# Patient Record
Sex: MALE | Race: OTHER | NOT HISPANIC OR LATINO | URBAN - METROPOLITAN AREA
[De-identification: names, ages, dates, MRNs, and addresses within clinical notes are randomized per-mention and may not be internally consistent; named-entity substitution may affect disease eponyms.]

---

## 2020-07-28 ENCOUNTER — INPATIENT (INPATIENT)
Facility: HOSPITAL | Age: 80
LOS: 2 days | Discharge: HOME | End: 2020-07-31
Attending: HOSPITALIST | Admitting: HOSPITALIST
Payer: MEDICARE

## 2020-07-28 VITALS
TEMPERATURE: 98 F | SYSTOLIC BLOOD PRESSURE: 192 MMHG | DIASTOLIC BLOOD PRESSURE: 80 MMHG | HEART RATE: 95 BPM | RESPIRATION RATE: 16 BRPM | OXYGEN SATURATION: 98 %

## 2020-07-28 DIAGNOSIS — R06.02 SHORTNESS OF BREATH: ICD-10-CM

## 2020-07-28 DIAGNOSIS — E11.9 TYPE 2 DIABETES MELLITUS WITHOUT COMPLICATIONS: ICD-10-CM

## 2020-07-28 DIAGNOSIS — H40.9 UNSPECIFIED GLAUCOMA: ICD-10-CM

## 2020-07-28 DIAGNOSIS — I82.90 ACUTE EMBOLISM AND THROMBOSIS OF UNSPECIFIED VEIN: ICD-10-CM

## 2020-07-28 DIAGNOSIS — I10 ESSENTIAL (PRIMARY) HYPERTENSION: ICD-10-CM

## 2020-07-28 LAB
ALBUMIN SERPL ELPH-MCNC: 4.4 G/DL — SIGNIFICANT CHANGE UP (ref 3.3–5)
ALP SERPL-CCNC: 68 U/L — SIGNIFICANT CHANGE UP (ref 40–120)
ALT FLD-CCNC: 19 U/L — SIGNIFICANT CHANGE UP (ref 4–41)
ANION GAP SERPL CALC-SCNC: 15 MMO/L — HIGH (ref 7–14)
APTT BLD: 32.5 SEC — SIGNIFICANT CHANGE UP (ref 27–36.3)
AST SERPL-CCNC: 19 U/L — SIGNIFICANT CHANGE UP (ref 4–40)
BASE EXCESS BLDV CALC-SCNC: 6.1 MMOL/L — SIGNIFICANT CHANGE UP
BASOPHILS # BLD AUTO: 0.03 K/UL — SIGNIFICANT CHANGE UP (ref 0–0.2)
BASOPHILS NFR BLD AUTO: 0.3 % — SIGNIFICANT CHANGE UP (ref 0–2)
BILIRUB SERPL-MCNC: 0.6 MG/DL — SIGNIFICANT CHANGE UP (ref 0.2–1.2)
BLOOD GAS VENOUS - CREATININE: 1.15 MG/DL — SIGNIFICANT CHANGE UP (ref 0.5–1.3)
BLOOD GAS VENOUS - FIO2: 21 — SIGNIFICANT CHANGE UP
BUN SERPL-MCNC: 26 MG/DL — HIGH (ref 7–23)
CALCIUM SERPL-MCNC: 9.5 MG/DL — SIGNIFICANT CHANGE UP (ref 8.4–10.5)
CHLORIDE BLDV-SCNC: 101 MMOL/L — SIGNIFICANT CHANGE UP (ref 96–108)
CHLORIDE SERPL-SCNC: 94 MMOL/L — LOW (ref 98–107)
CO2 SERPL-SCNC: 28 MMOL/L — SIGNIFICANT CHANGE UP (ref 22–31)
CREAT SERPL-MCNC: 1.17 MG/DL — SIGNIFICANT CHANGE UP (ref 0.5–1.3)
D DIMER BLD IA.RAPID-MCNC: 251 NG/ML — SIGNIFICANT CHANGE UP
EOSINOPHIL # BLD AUTO: 0.71 K/UL — HIGH (ref 0–0.5)
EOSINOPHIL NFR BLD AUTO: 7.5 % — HIGH (ref 0–6)
GAS PNL BLDV: 132 MMOL/L — LOW (ref 136–146)
GLUCOSE BLDC GLUCOMTR-MCNC: 249 MG/DL — HIGH (ref 70–99)
GLUCOSE BLDC GLUCOMTR-MCNC: 291 MG/DL — HIGH (ref 70–99)
GLUCOSE BLDV-MCNC: 166 MG/DL — HIGH (ref 70–99)
GLUCOSE SERPL-MCNC: 169 MG/DL — HIGH (ref 70–99)
HCO3 BLDV-SCNC: 26 MMOL/L — SIGNIFICANT CHANGE UP (ref 20–27)
HCT VFR BLD CALC: 48 % — SIGNIFICANT CHANGE UP (ref 39–50)
HCT VFR BLDV CALC: 52.7 % — HIGH (ref 39–51)
HGB BLD-MCNC: 15.5 G/DL — SIGNIFICANT CHANGE UP (ref 13–17)
HGB BLDV-MCNC: 17.2 G/DL — HIGH (ref 13–17)
IMM GRANULOCYTES NFR BLD AUTO: 0.3 % — SIGNIFICANT CHANGE UP (ref 0–1.5)
INR BLD: 0.99 — SIGNIFICANT CHANGE UP (ref 0.88–1.17)
LACTATE BLDV-MCNC: 2 MMOL/L — SIGNIFICANT CHANGE UP (ref 0.5–2)
LYMPHOCYTES # BLD AUTO: 2.17 K/UL — SIGNIFICANT CHANGE UP (ref 1–3.3)
LYMPHOCYTES # BLD AUTO: 22.8 % — SIGNIFICANT CHANGE UP (ref 13–44)
MCHC RBC-ENTMCNC: 28.9 PG — SIGNIFICANT CHANGE UP (ref 27–34)
MCHC RBC-ENTMCNC: 32.3 % — SIGNIFICANT CHANGE UP (ref 32–36)
MCV RBC AUTO: 89.4 FL — SIGNIFICANT CHANGE UP (ref 80–100)
MONOCYTES # BLD AUTO: 0.94 K/UL — HIGH (ref 0–0.9)
MONOCYTES NFR BLD AUTO: 9.9 % — SIGNIFICANT CHANGE UP (ref 2–14)
NEUTROPHILS # BLD AUTO: 5.62 K/UL — SIGNIFICANT CHANGE UP (ref 1.8–7.4)
NEUTROPHILS NFR BLD AUTO: 59.2 % — SIGNIFICANT CHANGE UP (ref 43–77)
NRBC # FLD: 0 K/UL — SIGNIFICANT CHANGE UP (ref 0–0)
NT-PROBNP SERPL-SCNC: 63.35 PG/ML — SIGNIFICANT CHANGE UP
NT-PROBNP SERPL-SCNC: 78.32 PG/ML — SIGNIFICANT CHANGE UP
PCO2 BLDV: 70 MMHG — HIGH (ref 41–51)
PH BLDV: 7.29 PH — LOW (ref 7.32–7.43)
PLATELET # BLD AUTO: 194 K/UL — SIGNIFICANT CHANGE UP (ref 150–400)
PMV BLD: 11.4 FL — SIGNIFICANT CHANGE UP (ref 7–13)
PO2 BLDV: 32 MMHG — LOW (ref 35–40)
POTASSIUM BLDV-SCNC: 4 MMOL/L — SIGNIFICANT CHANGE UP (ref 3.4–4.5)
POTASSIUM SERPL-MCNC: 4.5 MMOL/L — SIGNIFICANT CHANGE UP (ref 3.5–5.3)
POTASSIUM SERPL-SCNC: 4.5 MMOL/L — SIGNIFICANT CHANGE UP (ref 3.5–5.3)
PROT SERPL-MCNC: 7.7 G/DL — SIGNIFICANT CHANGE UP (ref 6–8.3)
PROTHROM AB SERPL-ACNC: 11.3 SEC — SIGNIFICANT CHANGE UP (ref 9.8–13.1)
RBC # BLD: 5.37 M/UL — SIGNIFICANT CHANGE UP (ref 4.2–5.8)
RBC # FLD: 13 % — SIGNIFICANT CHANGE UP (ref 10.3–14.5)
SAO2 % BLDV: 49.2 % — LOW (ref 60–85)
SARS-COV-2 RNA SPEC QL NAA+PROBE: SIGNIFICANT CHANGE UP
SODIUM SERPL-SCNC: 137 MMOL/L — SIGNIFICANT CHANGE UP (ref 135–145)
TROPONIN T, HIGH SENSITIVITY: 22 NG/L — SIGNIFICANT CHANGE UP (ref ?–14)
TROPONIN T, HIGH SENSITIVITY: 28 NG/L — SIGNIFICANT CHANGE UP (ref ?–14)
WBC # BLD: 9.5 K/UL — SIGNIFICANT CHANGE UP (ref 3.8–10.5)
WBC # FLD AUTO: 9.5 K/UL — SIGNIFICANT CHANGE UP (ref 3.8–10.5)

## 2020-07-28 PROCEDURE — 71250 CT THORAX DX C-: CPT | Mod: 26

## 2020-07-28 PROCEDURE — 71045 X-RAY EXAM CHEST 1 VIEW: CPT | Mod: 26

## 2020-07-28 PROCEDURE — 99285 EMERGENCY DEPT VISIT HI MDM: CPT

## 2020-07-28 PROCEDURE — 99223 1ST HOSP IP/OBS HIGH 75: CPT

## 2020-07-28 RX ORDER — HYDRALAZINE HCL 50 MG
5 TABLET ORAL ONCE
Refills: 0 | Status: COMPLETED | OUTPATIENT
Start: 2020-07-28 | End: 2020-07-28

## 2020-07-28 RX ORDER — LOSARTAN POTASSIUM 100 MG/1
100 TABLET, FILM COATED ORAL DAILY
Refills: 0 | Status: DISCONTINUED | OUTPATIENT
Start: 2020-07-28 | End: 2020-07-31

## 2020-07-28 RX ORDER — IPRATROPIUM/ALBUTEROL SULFATE 18-103MCG
3 AEROSOL WITH ADAPTER (GRAM) INHALATION EVERY 6 HOURS
Refills: 0 | Status: DISCONTINUED | OUTPATIENT
Start: 2020-07-28 | End: 2020-07-31

## 2020-07-28 RX ORDER — INSULIN LISPRO 100/ML
VIAL (ML) SUBCUTANEOUS
Refills: 0 | Status: DISCONTINUED | OUTPATIENT
Start: 2020-07-28 | End: 2020-07-31

## 2020-07-28 RX ORDER — AZITHROMYCIN 500 MG/1
500 TABLET, FILM COATED ORAL ONCE
Refills: 0 | Status: COMPLETED | OUTPATIENT
Start: 2020-07-28 | End: 2020-07-28

## 2020-07-28 RX ORDER — LATANOPROST 0.05 MG/ML
1 SOLUTION/ DROPS OPHTHALMIC; TOPICAL AT BEDTIME
Refills: 0 | Status: DISCONTINUED | OUTPATIENT
Start: 2020-07-28 | End: 2020-07-31

## 2020-07-28 RX ORDER — FUROSEMIDE 40 MG
20 TABLET ORAL ONCE
Refills: 0 | Status: COMPLETED | OUTPATIENT
Start: 2020-07-28 | End: 2020-07-28

## 2020-07-28 RX ORDER — HYDRALAZINE HCL 50 MG
10 TABLET ORAL THREE TIMES A DAY
Refills: 0 | Status: DISCONTINUED | OUTPATIENT
Start: 2020-07-28 | End: 2020-07-31

## 2020-07-28 RX ORDER — DEXTROSE 50 % IN WATER 50 %
12.5 SYRINGE (ML) INTRAVENOUS ONCE
Refills: 0 | Status: DISCONTINUED | OUTPATIENT
Start: 2020-07-28 | End: 2020-07-31

## 2020-07-28 RX ORDER — DEXTROSE 50 % IN WATER 50 %
15 SYRINGE (ML) INTRAVENOUS ONCE
Refills: 0 | Status: DISCONTINUED | OUTPATIENT
Start: 2020-07-28 | End: 2020-07-31

## 2020-07-28 RX ORDER — FUROSEMIDE 40 MG
20 TABLET ORAL DAILY
Refills: 0 | Status: DISCONTINUED | OUTPATIENT
Start: 2020-07-28 | End: 2020-07-29

## 2020-07-28 RX ORDER — DORZOLAMIDE HYDROCHLORIDE TIMOLOL MALEATE 20; 5 MG/ML; MG/ML
1 SOLUTION/ DROPS OPHTHALMIC
Refills: 0 | Status: DISCONTINUED | OUTPATIENT
Start: 2020-07-28 | End: 2020-07-31

## 2020-07-28 RX ORDER — TAMSULOSIN HYDROCHLORIDE 0.4 MG/1
0.4 CAPSULE ORAL AT BEDTIME
Refills: 0 | Status: DISCONTINUED | OUTPATIENT
Start: 2020-07-28 | End: 2020-07-31

## 2020-07-28 RX ORDER — INSULIN LISPRO 100/ML
VIAL (ML) SUBCUTANEOUS
Refills: 0 | Status: DISCONTINUED | OUTPATIENT
Start: 2020-07-28 | End: 2020-07-28

## 2020-07-28 RX ORDER — INSULIN LISPRO 100/ML
VIAL (ML) SUBCUTANEOUS AT BEDTIME
Refills: 0 | Status: DISCONTINUED | OUTPATIENT
Start: 2020-07-28 | End: 2020-07-28

## 2020-07-28 RX ORDER — LANOLIN ALCOHOL/MO/W.PET/CERES
3 CREAM (GRAM) TOPICAL AT BEDTIME
Refills: 0 | Status: DISCONTINUED | OUTPATIENT
Start: 2020-07-28 | End: 2020-07-31

## 2020-07-28 RX ORDER — GLUCAGON INJECTION, SOLUTION 0.5 MG/.1ML
1 INJECTION, SOLUTION SUBCUTANEOUS ONCE
Refills: 0 | Status: DISCONTINUED | OUTPATIENT
Start: 2020-07-28 | End: 2020-07-31

## 2020-07-28 RX ORDER — CEFTRIAXONE 500 MG/1
1000 INJECTION, POWDER, FOR SOLUTION INTRAMUSCULAR; INTRAVENOUS ONCE
Refills: 0 | Status: COMPLETED | OUTPATIENT
Start: 2020-07-28 | End: 2020-07-28

## 2020-07-28 RX ORDER — INSULIN LISPRO 100/ML
VIAL (ML) SUBCUTANEOUS AT BEDTIME
Refills: 0 | Status: DISCONTINUED | OUTPATIENT
Start: 2020-07-28 | End: 2020-07-31

## 2020-07-28 RX ORDER — DEXTROSE 50 % IN WATER 50 %
25 SYRINGE (ML) INTRAVENOUS ONCE
Refills: 0 | Status: DISCONTINUED | OUTPATIENT
Start: 2020-07-28 | End: 2020-07-31

## 2020-07-28 RX ORDER — ENOXAPARIN SODIUM 100 MG/ML
40 INJECTION SUBCUTANEOUS DAILY
Refills: 0 | Status: DISCONTINUED | OUTPATIENT
Start: 2020-07-28 | End: 2020-07-31

## 2020-07-28 RX ORDER — SODIUM CHLORIDE 9 MG/ML
1000 INJECTION, SOLUTION INTRAVENOUS
Refills: 0 | Status: DISCONTINUED | OUTPATIENT
Start: 2020-07-28 | End: 2020-07-31

## 2020-07-28 RX ORDER — KETOROLAC TROMETHAMINE 0.5 %
1 DROPS OPHTHALMIC (EYE)
Refills: 0 | Status: DISCONTINUED | OUTPATIENT
Start: 2020-07-28 | End: 2020-07-31

## 2020-07-28 RX ORDER — KETOROLAC TROMETHAMINE 0.5 %
0 DROPS OPHTHALMIC (EYE)
Qty: 0 | Refills: 0 | DISCHARGE

## 2020-07-28 RX ORDER — IPRATROPIUM BROMIDE 0.2 MG/ML
1 SOLUTION, NON-ORAL INHALATION ONCE
Refills: 0 | Status: COMPLETED | OUTPATIENT
Start: 2020-07-28 | End: 2020-07-28

## 2020-07-28 RX ORDER — HYDRALAZINE HCL 50 MG
10 TABLET ORAL
Refills: 0 | Status: DISCONTINUED | OUTPATIENT
Start: 2020-07-28 | End: 2020-07-28

## 2020-07-28 RX ADMIN — Medication 1 DROP(S): at 18:01

## 2020-07-28 RX ADMIN — ENOXAPARIN SODIUM 40 MILLIGRAM(S): 100 INJECTION SUBCUTANEOUS at 21:00

## 2020-07-28 RX ADMIN — Medication 1 DROP(S): at 18:00

## 2020-07-28 RX ADMIN — AZITHROMYCIN 255 MILLIGRAM(S): 500 TABLET, FILM COATED ORAL at 10:54

## 2020-07-28 RX ADMIN — Medication 20 MILLIGRAM(S): at 06:42

## 2020-07-28 RX ADMIN — TAMSULOSIN HYDROCHLORIDE 0.4 MILLIGRAM(S): 0.4 CAPSULE ORAL at 21:12

## 2020-07-28 RX ADMIN — Medication 50 MILLIGRAM(S): at 09:29

## 2020-07-28 RX ADMIN — CEFTRIAXONE 100 MILLIGRAM(S): 500 INJECTION, POWDER, FOR SOLUTION INTRAMUSCULAR; INTRAVENOUS at 10:31

## 2020-07-28 RX ADMIN — Medication 5 MILLIGRAM(S): at 18:23

## 2020-07-28 RX ADMIN — Medication 10 MILLIGRAM(S): at 18:12

## 2020-07-28 RX ADMIN — Medication 40 MILLIGRAM(S): at 18:02

## 2020-07-28 RX ADMIN — Medication 10 MILLIGRAM(S): at 21:12

## 2020-07-28 RX ADMIN — Medication 1: at 21:14

## 2020-07-28 RX ADMIN — DORZOLAMIDE HYDROCHLORIDE TIMOLOL MALEATE 1 DROP(S): 20; 5 SOLUTION/ DROPS OPHTHALMIC at 18:01

## 2020-07-28 RX ADMIN — Medication 1 PUFF(S): at 11:16

## 2020-07-28 RX ADMIN — Medication 4: at 18:12

## 2020-07-28 RX ADMIN — LATANOPROST 1 DROP(S): 0.05 SOLUTION/ DROPS OPHTHALMIC; TOPICAL at 23:13

## 2020-07-28 RX ADMIN — Medication 3 MILLILITER(S): at 20:16

## 2020-07-28 NOTE — ED PROVIDER NOTE - NS ED ROS FT
GENERAL: +Weakness, No fever or chills, EYES: no change in vision, HEENT: no trouble swallowing or speaking, CARDIAC: no chest pain,   PULMONARY: no cough, +SOB, GI: no abdominal pain, no nausea, no vomiting, no diarrhea or constipation, : No changes in urination, SKIN: no rashes, NEURO: no headache,  MSK: No joint pain ~Jose Patrick M.D. Resident

## 2020-07-28 NOTE — ED ADULT TRIAGE NOTE - CHIEF COMPLAINT QUOTE
pt c/o asthma exacerbation since 1am with relief from one albuterol treatment. denies chest pain, fevers, chills or cough. states he just finished a course of antibiotics but unsure which

## 2020-07-28 NOTE — H&P ADULT - PROBLEM SELECTOR PLAN 3
Continue home medications hydralazine and losartan.  would hold beta blocker at this time for possible COPD/asthma exacerbation  DASH diet  Monitor blood pressure

## 2020-07-28 NOTE — H&P ADULT - PROBLEM SELECTOR PLAN 1
start on solumedrol 40mg IVP BID  nebulizer q6hrs PRN   will consult pulmonary  CXR showed no acute pulmonary disease  Ddimer sent to r/o PE, consider CTA if elevated admit to telemetry for possible COPD/asthma exacerbation.   patient is euvolemic on exam, on lasix 20mg po at home, given lasix 20mg IVP in ED. proBNP 78  start on solumedrol 40mg IVP BID  nebulizer q6hrs PRN   will consult pulmonary  CXR showed no acute pulmonary disease  Ddimer sent to r/o PE, consider CTA if elevated admit to telemetry for possible COPD/asthma exacerbation.   patient is euvolemic on exam, on lasix 20mg po at home, given lasix 20mg IVP in ED. proBNP 78. COVID negative  start on solumedrol 40mg IVP BID  nebulizer q6hrs PRN   will consult pulmonary  CXR showed no acute pulmonary disease  Ddimer sent to r/o PE, consider CTA if elevated admit to telemetry for possible COPD/asthma exacerbation.   patient is euvolemic on exam, on lasix 20mg po at home, given lasix 20mg IVP in ED. proBNP 78. COVID negative. hst 22>>28.  start on solumedrol 40mg IVP BID  nebulizer q6hrs PRN   will consult pulmonary  CXR showed no acute pulmonary disease  Ddimer sent to r/o PE, consider CTA if elevated admit to telemetry for possible COPD/asthma exacerbation.   patient is euvolemic on exam, on lasix 20mg po at home, given lasix 20mg IVP in ED. proBNP 78. COVID negative. hst 22>>28. afebrile, without leukocytosis  CXR without pulmonary disease  start on solumedrol 40mg IVP BID  nebulizer q6hrs PRN   will consult pulmonary  CXR showed no acute pulmonary disease  Ddimer sent to r/o PE, consider CTA if elevated admit to telemetry for possible COPD/asthma exacerbation.   patient is euvolemic on exam, on lasix 20mg po at home, given lasix 20mg IVP in ED. proBNP 78. COVID negative. hst 22>>28. afebrile, without leukocytosis  CXR without acute pulmonary disease  start on solumedrol 40mg IVP BID  nebulizer q6hrs PRN   will consult pulmonary  Ddimer sent to r/o PE, consider CTA if elevated

## 2020-07-28 NOTE — H&P ADULT - NEGATIVE ENMT SYMPTOMS
no tinnitus/no nasal discharge/no hearing difficulty/no nasal congestion/no sinus symptoms/no vertigo/no ear pain/no nasal obstruction

## 2020-07-28 NOTE — ED ADULT NURSE NOTE - OBJECTIVE STATEMENT
Pt is an 80yr old male, A&OX4 and ambulatory, complaining of generalized weakness and worsening SOB on exertion x1 week. Pt noted to be tachypneic at rest, audible wheezing noted. Pt found to be hypoxic to 90% on RA, placed on 2L NC as per order- O2 sat at 99% now. States he was given albuterol from PCP with no relief. Rectal temp. 99.0F. No noted LE edema. +ROM in all extremities. Strength/sensation intact. Denies chest pain, headache, dizziness, abd. pain, N/V, fever/chills, cough, and dysuria at this time. VS as noted. NAD. 20g IV placed in the L AC. Labs sent. NSR with PVC's on the cardiac monitor. MD Wang aware. Will continue to monitor.

## 2020-07-28 NOTE — H&P ADULT - HISTORY OF PRESENT ILLNESS
80 yr old M w/ PMhx of HTN, T2DM, glaucoma presents with worsening shortness of breath x 4 days.  He also mentioned wheezing for 2-3 days now. He currently has a dry cough, ongoing for 1 month now.  He denied any history of asthma,  He denied fever, chills, chest pain, palpitation, orthopnea,  leg swelling, sick contact, or recent travel. He noticed in the last month, he is more fatigue and appetite is poor.     In ED, patient received atrovent pump, prednisone 50mgpo, ceftriaxone IV, azithromycin IV.   CXR showed no acute pulmonary disease.      Patient verbalized relief, on exam still had mild wheezing. 80 yr old M w/ PMhx of HTN, T2DM, glaucoma presents with worsening shortness of breath x 4 days.  He also mentioned wheezing for 2-3 days now. He currently has a dry cough, ongoing for 1 month now.  He denied any history of asthma/COPD,  He denied fever, chills, chest pain, palpitation, orthopnea,  leg swelling, sick contact, or recent travel. He noticed in the last month, he is more fatigue and appetite is poor.     In ED, patient received atrovent pump, prednisone 50mgpo, ceftriaxone IV, azithromycin IV.   CXR showed no acute pulmonary disease.      Patient verbalized relief, on exam still had mild wheezing, can speak in full sentences. 02 sat 97% NC2L, RR 18.

## 2020-07-28 NOTE — H&P ADULT - ATTENDING COMMENTS
80 yr old M w/ PMhx of HTN, T2DM, glaucoma presents with worsening shortness of breath x 4 days. Former smoker, no prior dx of COPD/asthma. clinically appears to have COPD exacerbation responding well to IV steroids, IV abx and bronchodilators.  Wells Score 0 for PE, age appropriate d-dimer  PE: non toxic, on 2LNC, noted to have b/l diffuse rhonchi  pending CT chest to evaluate lung pathology, f/u TTE  resume home meds, hold beta blocker given acute exacerbation    Plan Discussed with patient and PA.

## 2020-07-28 NOTE — ED ADULT NURSE REASSESSMENT NOTE - NS ED NURSE REASSESS COMMENT FT1
Pt a&ox3 breathing even and mildly labored at present, states minimal relief after receiving iv lasix medication, denies any present pain, nsr on monitor, ivl clear and patent, will continue to monitor.

## 2020-07-28 NOTE — H&P ADULT - ASSESSMENT
80 yr old M w/ PMhx of HTN, T2DM, glaucoma admitted for shortness of breath and wheezing. 80 yr old M w/ PMhx of HTN, T2DM, glaucoma admitted for shortness of breath.

## 2020-07-28 NOTE — H&P ADULT - NSHPLABSRESULTS_GEN_ALL_CORE
LABORATORY VALUES	 	                          15.5   9.50  )-----------( 194      ( 28 Jul 2020 06:17 )             48.0     07-28    137  |  94<L>  |  26<H>  ----------------------------<  169<H>  4.5   |  28  |  1.17    Ca    9.5      28 Jul 2020 06:17    TPro  7.7  /  Alb  4.4  /  TBili  0.6  /  DBili  x   /  AST  19  /  ALT  19  /  AlkPhos  68  07-28    LIVER FUNCTIONS - ( 28 Jul 2020 06:17 )  Alb: 4.4 g/dL / Pro: 7.7 g/dL / ALK PHOS: 68 u/L / ALT: 19 u/L / AST: 19 u/L / GGT: x           INR: 0.99 (07-28 @ 06:17)      CARDIAC MARKERS:  Troponin T, High Sensitivity: 28 ng/L (07-28-20 @ 09:25)  Troponin T, High Sensitivity: 22 ng/L (07-28-20 @ 06:17)    Serum Pro-Brain Natriuretic Peptide: 78.32 pg/mL (07-28 @ 09:25)  Serum Pro-Brain Natriuretic Peptide: 63.35 pg/mL (07-28 @ 06:17)    Blood Gas Venous - Lactate: 2.0 mmol/L (07-28 @ 06:17)      < from: Xray Chest 1 View- PORTABLE-Urgent (07.28.20 @ 07:30) >    IMPRESSION:  No acute pulmonary disease.     EKG: NSR w/ LBBB, 91 bpm, qtc 489

## 2020-07-28 NOTE — H&P ADULT - NEGATIVE MUSCULOSKELETAL SYMPTOMS
no joint swelling/no arthralgia/no arthritis/no stiffness/no neck pain/no muscle cramps/no muscle weakness/no myalgia

## 2020-07-28 NOTE — H&P ADULT - PROBLEM SELECTOR PLAN 2
Has h/o diabetes and is on oral hypoglycemic agents   Check HbA1c  Hold oral hypoglycemic agents  Monitor blood sugars ac and hs  Lispro insulin sliding scale  Low carbohydrate diet  Diabetes education  Endocrine consult if needed

## 2020-07-28 NOTE — ED PROVIDER NOTE - ATTENDING CONTRIBUTION TO CARE
81 y/o M with h/o HTN, DM here with SOB.  Pt reports 1 month of progressively worsening dyspnea on exertion.  He states prior to this he was able to walk miles without difficulty, until about a month ago.  In the past few days the SOB has worsened and now gets SOB after 3 steps.  No associated chest pain, fever, back pain, abd pain, n/v/d, leg pain or swelling.  He reports a dry cough in the past few days.  No medication changes.  Denies recent travel or known sick contacts.  Pt is sitting in stretcher, awake and alert, nontoxic.  AF/VSS, hypoxic to 88% on room air.  Pt is mildly dyspneic with speech, tachypneic to low 20s, but no retractions.  Lungs cta bl with bibasilar rales and diffuse coarse expiratory wheeze.  Cards nl S1/S2, RRR, no MRG.  Abd soft ntnd.  No pedal edema or calf tenderness.  Exam and sxs concerning for fluid overload/new HF - will obtain ekg, labs incl trop/bnp, cxr, diuresis, admit tele.

## 2020-07-28 NOTE — ED PROVIDER NOTE - CLINICAL SUMMARY MEDICAL DECISION MAKING FREE TEXT BOX
80yM h/o HTN and diabetes presents with worsening weakness, wheezing and sob of 1 week duration. Sating in low 90s on RA with b/l b lines on bedside sono. Concern for CHF. Will get ekg, labs, trop, pro bnp, lasix, CXR and reassess

## 2020-07-28 NOTE — H&P ADULT - NEGATIVE PSYCHIATRIC SYMPTOMS
no anxiety/no memory loss/no mood swings/no agitation/no insomnia/no paranoia/no suicidal ideation/no depression

## 2020-07-28 NOTE — H&P ADULT - RS GEN PE MLT RESP DETAILS PC
respirations non-labored/normal/no intercostal retractions/no rales/good air movement/breath sounds equal/no rhonchi/airway patent/no chest wall tenderness/wheezes/no subcutaneous emphysema

## 2020-07-28 NOTE — ED PROVIDER NOTE - OBJECTIVE STATEMENT
80yM h/o HTN and diabetes presents with worsening sob of 1 week duration. 80yM h/o HTN and diabetes presents with worsening weakness, wheezing and sob of 1 week duration. Presented today due to inability to take a few steps without becoming short of breath. Contrary to triage note, patient does not have a history of asthma. States that he received a pump from PCP due to wheezing. No fever, cough, chest pain, sick contacts

## 2020-07-28 NOTE — ED PROVIDER NOTE - PROGRESS NOTE DETAILS
Pt feels better- + cough, diffuse wheezes and bibasilar crackles, legs not swollen, put in call to PMD Dr. Jiang- admitting to hospitalist- speaking with Dr. Carol Irvin presently Spoke with Dr. Jiang aware pt is getting admitted- spoke with hospitalist who agrees with noncontrast CT chest - pt feels better after steroids, and iv antibiotics, decreased wheezing on lung exam, no only occassional wheeze

## 2020-07-28 NOTE — H&P ADULT - NEGATIVE GENERAL SYMPTOMS
no fever/no weight loss/no anorexia/no polydipsia/no sweating/no weight gain/no polyphagia/no chills/no polyuria/no malaise

## 2020-07-28 NOTE — H&P ADULT - NEGATIVE NEUROLOGICAL SYMPTOMS
no transient paralysis/no weakness/no headache/no syncope/no focal seizures/no loss of consciousness/no hemiparesis/no tremors/no generalized seizures/no difficulty walking/no vertigo/no loss of sensation/no paresthesias

## 2020-07-28 NOTE — ED PROVIDER NOTE - PHYSICAL EXAMINATION
Gen: AAOx3, non-toxic  Head: NCAT  HEENT: EOMI, oral mucosa moist, normal conjunctiva  Lung: B/l wheezing, speaking in full sentences  CV: RRR, no murmurs, rubs or gallops  Abd: soft, NTND, no guarding  MSK: no visible deformities  Neuro: No focal sensory or motor deficits  Skin: Warm, well perfused, no rash  Psych: normal affect.   ~Jose Patrick M.D. Resident

## 2020-07-28 NOTE — H&P ADULT - MUSCULOSKELETAL
details… detailed exam no joint swelling/no joint erythema/no joint warmth/no calf tenderness/normal strength/normal/ROM intact

## 2020-07-29 DIAGNOSIS — J18.9 PNEUMONIA, UNSPECIFIED ORGANISM: ICD-10-CM

## 2020-07-29 LAB
ANION GAP SERPL CALC-SCNC: 12 MMO/L — SIGNIFICANT CHANGE UP (ref 7–14)
BUN SERPL-MCNC: 36 MG/DL — HIGH (ref 7–23)
CALCIUM SERPL-MCNC: 9.1 MG/DL — SIGNIFICANT CHANGE UP (ref 8.4–10.5)
CHLORIDE SERPL-SCNC: 91 MMOL/L — LOW (ref 98–107)
CO2 SERPL-SCNC: 29 MMOL/L — SIGNIFICANT CHANGE UP (ref 22–31)
CREAT SERPL-MCNC: 1.31 MG/DL — HIGH (ref 0.5–1.3)
GLUCOSE BLDC GLUCOMTR-MCNC: 261 MG/DL — HIGH (ref 70–99)
GLUCOSE SERPL-MCNC: 221 MG/DL — HIGH (ref 70–99)
HBA1C BLD-MCNC: 8.6 % — HIGH (ref 4–5.6)
HCT VFR BLD CALC: 46.5 % — SIGNIFICANT CHANGE UP (ref 39–50)
HGB BLD-MCNC: 14.9 G/DL — SIGNIFICANT CHANGE UP (ref 13–17)
MAGNESIUM SERPL-MCNC: 1.8 MG/DL — SIGNIFICANT CHANGE UP (ref 1.6–2.6)
MCHC RBC-ENTMCNC: 28.9 PG — SIGNIFICANT CHANGE UP (ref 27–34)
MCHC RBC-ENTMCNC: 32 % — SIGNIFICANT CHANGE UP (ref 32–36)
MCV RBC AUTO: 90.3 FL — SIGNIFICANT CHANGE UP (ref 80–100)
NRBC # FLD: 0 K/UL — SIGNIFICANT CHANGE UP (ref 0–0)
PHOSPHATE SERPL-MCNC: 3.6 MG/DL — SIGNIFICANT CHANGE UP (ref 2.5–4.5)
PLATELET # BLD AUTO: 190 K/UL — SIGNIFICANT CHANGE UP (ref 150–400)
PMV BLD: 11.3 FL — SIGNIFICANT CHANGE UP (ref 7–13)
POTASSIUM SERPL-MCNC: 4.2 MMOL/L — SIGNIFICANT CHANGE UP (ref 3.5–5.3)
POTASSIUM SERPL-SCNC: 4.2 MMOL/L — SIGNIFICANT CHANGE UP (ref 3.5–5.3)
RBC # BLD: 5.15 M/UL — SIGNIFICANT CHANGE UP (ref 4.2–5.8)
RBC # FLD: 12.9 % — SIGNIFICANT CHANGE UP (ref 10.3–14.5)
SARS-COV-2 RNA SPEC QL NAA+PROBE: SIGNIFICANT CHANGE UP
SODIUM SERPL-SCNC: 132 MMOL/L — LOW (ref 135–145)
WBC # BLD: 10.04 K/UL — SIGNIFICANT CHANGE UP (ref 3.8–10.5)
WBC # FLD AUTO: 10.04 K/UL — SIGNIFICANT CHANGE UP (ref 3.8–10.5)

## 2020-07-29 PROCEDURE — 93306 TTE W/DOPPLER COMPLETE: CPT | Mod: 26

## 2020-07-29 PROCEDURE — 99233 SBSQ HOSP IP/OBS HIGH 50: CPT

## 2020-07-29 RX ORDER — CEFTRIAXONE 500 MG/1
INJECTION, POWDER, FOR SOLUTION INTRAMUSCULAR; INTRAVENOUS
Refills: 0 | Status: DISCONTINUED | OUTPATIENT
Start: 2020-07-29 | End: 2020-07-31

## 2020-07-29 RX ORDER — METOPROLOL TARTRATE 50 MG
50 TABLET ORAL
Refills: 0 | Status: DISCONTINUED | OUTPATIENT
Start: 2020-07-29 | End: 2020-07-31

## 2020-07-29 RX ORDER — AZITHROMYCIN 500 MG/1
500 TABLET, FILM COATED ORAL EVERY 24 HOURS
Refills: 0 | Status: DISCONTINUED | OUTPATIENT
Start: 2020-07-30 | End: 2020-07-31

## 2020-07-29 RX ORDER — AZITHROMYCIN 500 MG/1
500 TABLET, FILM COATED ORAL ONCE
Refills: 0 | Status: COMPLETED | OUTPATIENT
Start: 2020-07-29 | End: 2020-07-29

## 2020-07-29 RX ORDER — CEFTRIAXONE 500 MG/1
1000 INJECTION, POWDER, FOR SOLUTION INTRAMUSCULAR; INTRAVENOUS ONCE
Refills: 0 | Status: COMPLETED | OUTPATIENT
Start: 2020-07-29 | End: 2020-07-29

## 2020-07-29 RX ORDER — CEFTRIAXONE 500 MG/1
1000 INJECTION, POWDER, FOR SOLUTION INTRAMUSCULAR; INTRAVENOUS EVERY 24 HOURS
Refills: 0 | Status: DISCONTINUED | OUTPATIENT
Start: 2020-07-30 | End: 2020-07-31

## 2020-07-29 RX ORDER — LANOLIN ALCOHOL/MO/W.PET/CERES
3 CREAM (GRAM) TOPICAL ONCE
Refills: 0 | Status: COMPLETED | OUTPATIENT
Start: 2020-07-29 | End: 2020-07-29

## 2020-07-29 RX ORDER — AZITHROMYCIN 500 MG/1
TABLET, FILM COATED ORAL
Refills: 0 | Status: DISCONTINUED | OUTPATIENT
Start: 2020-07-29 | End: 2020-07-31

## 2020-07-29 RX ADMIN — Medication 6: at 12:55

## 2020-07-29 RX ADMIN — Medication 10 MILLIGRAM(S): at 21:05

## 2020-07-29 RX ADMIN — Medication 6: at 08:53

## 2020-07-29 RX ADMIN — Medication 1 DROP(S): at 05:25

## 2020-07-29 RX ADMIN — LATANOPROST 1 DROP(S): 0.05 SOLUTION/ DROPS OPHTHALMIC; TOPICAL at 21:05

## 2020-07-29 RX ADMIN — Medication 1 DROP(S): at 05:24

## 2020-07-29 RX ADMIN — TAMSULOSIN HYDROCHLORIDE 0.4 MILLIGRAM(S): 0.4 CAPSULE ORAL at 21:05

## 2020-07-29 RX ADMIN — DORZOLAMIDE HYDROCHLORIDE TIMOLOL MALEATE 1 DROP(S): 20; 5 SOLUTION/ DROPS OPHTHALMIC at 05:24

## 2020-07-29 RX ADMIN — Medication 50 MILLIGRAM(S): at 17:21

## 2020-07-29 RX ADMIN — Medication 1 DROP(S): at 17:16

## 2020-07-29 RX ADMIN — Medication 1 DROP(S): at 17:17

## 2020-07-29 RX ADMIN — CEFTRIAXONE 100 MILLIGRAM(S): 500 INJECTION, POWDER, FOR SOLUTION INTRAMUSCULAR; INTRAVENOUS at 10:25

## 2020-07-29 RX ADMIN — Medication 8: at 17:29

## 2020-07-29 RX ADMIN — LOSARTAN POTASSIUM 100 MILLIGRAM(S): 100 TABLET, FILM COATED ORAL at 05:24

## 2020-07-29 RX ADMIN — Medication 40 MILLIGRAM(S): at 05:24

## 2020-07-29 RX ADMIN — Medication 10 MILLIGRAM(S): at 05:28

## 2020-07-29 RX ADMIN — ENOXAPARIN SODIUM 40 MILLIGRAM(S): 100 INJECTION SUBCUTANEOUS at 12:56

## 2020-07-29 RX ADMIN — DORZOLAMIDE HYDROCHLORIDE TIMOLOL MALEATE 1 DROP(S): 20; 5 SOLUTION/ DROPS OPHTHALMIC at 17:16

## 2020-07-29 RX ADMIN — Medication 3 MILLILITER(S): at 14:39

## 2020-07-29 RX ADMIN — Medication 3 MILLIGRAM(S): at 21:05

## 2020-07-29 RX ADMIN — AZITHROMYCIN 255 MILLIGRAM(S): 500 TABLET, FILM COATED ORAL at 12:57

## 2020-07-29 RX ADMIN — Medication 20 MILLIGRAM(S): at 05:24

## 2020-07-29 RX ADMIN — Medication 3 MILLIGRAM(S): at 00:27

## 2020-07-29 RX ADMIN — Medication 10 MILLIGRAM(S): at 13:56

## 2020-07-29 NOTE — PROGRESS NOTE ADULT - SUBJECTIVE AND OBJECTIVE BOX
[facility] Division of Hospital Medicine  Curtis Lua MD  Pager [phone]      Patient is a 80y old  Male who presents with a chief complaint of shortness of breath and wheezing (28 Jul 2020 14:57)      SUBJECTIVE / OVERNIGHT EVENTS: Improved SOB    MEDICATIONS  (STANDING):  artificial tears (preservative free) Ophthalmic Solution 1 Drop(s) Both EYES two times a day  azithromycin  IVPB 500 milliGRAM(s) IV Intermittent once  azithromycin  IVPB      cefTRIAXone   IVPB      dextrose 5%. 1000 milliLiter(s) (50 mL/Hr) IV Continuous <Continuous>  dextrose 50% Injectable 12.5 Gram(s) IV Push once  dextrose 50% Injectable 25 Gram(s) IV Push once  dextrose 50% Injectable 25 Gram(s) IV Push once  dorzolamide 2%/timolol 0.5% Ophthalmic Solution 1 Drop(s) Both EYES two times a day  enoxaparin Injectable 40 milliGRAM(s) SubCutaneous daily  furosemide    Tablet 20 milliGRAM(s) Oral daily  hydrALAZINE 10 milliGRAM(s) Oral three times a day  insulin lispro (HumaLOG) corrective regimen sliding scale   SubCutaneous three times a day before meals  insulin lispro (HumaLOG) corrective regimen sliding scale   SubCutaneous at bedtime  ketorolac 0.5% Ophthalmic Solution 1 Drop(s) Right EYE two times a day  latanoprost 0.005% Ophthalmic Solution 1 Drop(s) Both EYES at bedtime  losartan 100 milliGRAM(s) Oral daily  melatonin 3 milliGRAM(s) Oral at bedtime  methylPREDNISolone sodium succinate Injectable 40 milliGRAM(s) IV Push two times a day  tamsulosin 0.4 milliGRAM(s) Oral at bedtime    MEDICATIONS  (PRN):  albuterol/ipratropium for Nebulization. 3 milliLiter(s) Nebulizer every 6 hours PRN Wheezing  dextrose 40% Gel 15 Gram(s) Oral once PRN Blood Glucose LESS THAN 70 milliGRAM(s)/deciliter  glucagon  Injectable 1 milliGRAM(s) IntraMuscular once PRN Glucose LESS THAN 70 milligrams/deciliter      CAPILLARY BLOOD GLUCOSE      POCT Blood Glucose.: 266 mg/dL (29 Jul 2020 12:46)  POCT Blood Glucose.: 261 mg/dL (29 Jul 2020 08:48)  POCT Blood Glucose.: 291 mg/dL (28 Jul 2020 21:10)  POCT Blood Glucose.: 249 mg/dL (28 Jul 2020 17:56)    I&O's Summary    28 Jul 2020 07:01  -  29 Jul 2020 07:00  --------------------------------------------------------  IN: 100 mL / OUT: 175 mL / NET: -75 mL    29 Jul 2020 07:01  -  29 Jul 2020 12:55  --------------------------------------------------------  IN: 120 mL / OUT: 0 mL / NET: 120 mL        PHYSICAL EXAM:  Vital Signs Last 24 Hrs  T(C): 36.6 (29 Jul 2020 05:22), Max: 37 (28 Jul 2020 13:49)  T(F): 97.9 (29 Jul 2020 05:22), Max: 98.6 (28 Jul 2020 13:49)  HR: 100 (29 Jul 2020 05:22) (78 - 109)  BP: 158/78 (29 Jul 2020 05:22) (132/65 - 185/102)  BP(mean): --  RR: 18 (29 Jul 2020 05:22) (18 - 20)  SpO2: 97% (29 Jul 2020 05:22) (95% - 100%)    CONSTITUTIONAL: NAD  EYES: conjunctiva and sclera clear  ENMT: mmm  NECK: Supple,  RESPIRATORY: diffuse crackles   CARDIOVASCULAR: Regular rate and rhythm, + S1 and S2  ABDOMEN: Nontender to palpation, normoactive bowel sounds, no rebound/guarding  MUSCULOSKELETAL:  no clubbing or cyanosis of digits;   PSYCH: A+O x 3  NEUROLOGY: no gross deficits   SKIN: No rashes;     LABS:                        14.9   10.04 )-----------( 190      ( 29 Jul 2020 07:30 )             46.5     07-29    132<L>  |  91<L>  |  36<H>  ----------------------------<  221<H>  4.2   |  29  |  1.31<H>    Ca    9.1      29 Jul 2020 07:30  Phos  3.6     07-29  Mg     1.8     07-29    TPro  7.7  /  Alb  4.4  /  TBili  0.6  /  DBili  x   /  AST  19  /  ALT  19  /  AlkPhos  68  07-28    PT/INR - ( 28 Jul 2020 06:17 )   PT: 11.3 SEC;   INR: 0.99          PTT - ( 28 Jul 2020 06:17 )  PTT:32.5 SEC

## 2020-07-29 NOTE — PROGRESS NOTE ADULT - PROBLEM SELECTOR PLAN 2
FS uncontrolled, will add Lantus 5  ISS and Trend FS. Consistent carb diet  HbA1c 8.6  Hold oral hypoglycemic agents

## 2020-07-29 NOTE — PROGRESS NOTE ADULT - PROBLEM SELECTOR PLAN 1
as seen on CT, c/w Abx azithromycin and ceftriaxone  Per PMD Dr. Jiang, No hx of COPD or Asthma, dc steroids  patient is euvolemic on exam, on lasix 20mg po at home,   proBNP 78. COVID negative will repeat. hst 22>>28. afebrile,   nebulizer q6hrs PRN   Ddimer normal

## 2020-07-29 NOTE — PROGRESS NOTE ADULT - PROBLEM SELECTOR PLAN 3
Continue home medications hydralazine and losartan.    resume lopressor given tachycardia at times  Will hold Lasix given slight bump in cr  DASH diet  Monitor blood pressure

## 2020-07-30 DIAGNOSIS — N17.9 ACUTE KIDNEY FAILURE, UNSPECIFIED: ICD-10-CM

## 2020-07-30 LAB
ANION GAP SERPL CALC-SCNC: 8 MMO/L — SIGNIFICANT CHANGE UP (ref 7–14)
BUN SERPL-MCNC: 48 MG/DL — HIGH (ref 7–23)
CALCIUM SERPL-MCNC: 9.1 MG/DL — SIGNIFICANT CHANGE UP (ref 8.4–10.5)
CHLORIDE SERPL-SCNC: 89 MMOL/L — LOW (ref 98–107)
CO2 SERPL-SCNC: 31 MMOL/L — SIGNIFICANT CHANGE UP (ref 22–31)
CREAT SERPL-MCNC: 1.46 MG/DL — HIGH (ref 0.5–1.3)
GLUCOSE SERPL-MCNC: 101 MG/DL — HIGH (ref 70–99)
HCT VFR BLD CALC: 43.3 % — SIGNIFICANT CHANGE UP (ref 39–50)
HGB BLD-MCNC: 14.5 G/DL — SIGNIFICANT CHANGE UP (ref 13–17)
MCHC RBC-ENTMCNC: 30 PG — SIGNIFICANT CHANGE UP (ref 27–34)
MCHC RBC-ENTMCNC: 33.5 % — SIGNIFICANT CHANGE UP (ref 32–36)
MCV RBC AUTO: 89.5 FL — SIGNIFICANT CHANGE UP (ref 80–100)
NRBC # FLD: 0 K/UL — SIGNIFICANT CHANGE UP (ref 0–0)
PLATELET # BLD AUTO: 192 K/UL — SIGNIFICANT CHANGE UP (ref 150–400)
PMV BLD: 11.5 FL — SIGNIFICANT CHANGE UP (ref 7–13)
POTASSIUM SERPL-MCNC: 4.7 MMOL/L — SIGNIFICANT CHANGE UP (ref 3.5–5.3)
POTASSIUM SERPL-SCNC: 4.7 MMOL/L — SIGNIFICANT CHANGE UP (ref 3.5–5.3)
RBC # BLD: 4.84 M/UL — SIGNIFICANT CHANGE UP (ref 4.2–5.8)
RBC # FLD: 13.4 % — SIGNIFICANT CHANGE UP (ref 10.3–14.5)
SARS-COV-2 IGG SERPL QL IA: NEGATIVE — SIGNIFICANT CHANGE UP
SARS-COV-2 IGM SERPL IA-ACNC: <0.1 INDEX — SIGNIFICANT CHANGE UP
SODIUM SERPL-SCNC: 128 MMOL/L — LOW (ref 135–145)
WBC # BLD: 11.12 K/UL — HIGH (ref 3.8–10.5)
WBC # FLD AUTO: 11.12 K/UL — HIGH (ref 3.8–10.5)

## 2020-07-30 PROCEDURE — 99233 SBSQ HOSP IP/OBS HIGH 50: CPT

## 2020-07-30 RX ORDER — INSULIN LISPRO 100/ML
3 VIAL (ML) SUBCUTANEOUS
Refills: 0 | Status: DISCONTINUED | OUTPATIENT
Start: 2020-07-30 | End: 2020-07-31

## 2020-07-30 RX ORDER — SODIUM CHLORIDE 9 MG/ML
1000 INJECTION INTRAMUSCULAR; INTRAVENOUS; SUBCUTANEOUS
Refills: 0 | Status: DISCONTINUED | OUTPATIENT
Start: 2020-07-30 | End: 2020-07-31

## 2020-07-30 RX ADMIN — DORZOLAMIDE HYDROCHLORIDE TIMOLOL MALEATE 1 DROP(S): 20; 5 SOLUTION/ DROPS OPHTHALMIC at 17:50

## 2020-07-30 RX ADMIN — Medication 1 DROP(S): at 17:50

## 2020-07-30 RX ADMIN — Medication 10 MILLIGRAM(S): at 06:05

## 2020-07-30 RX ADMIN — Medication 50 MILLIGRAM(S): at 06:29

## 2020-07-30 RX ADMIN — AZITHROMYCIN 255 MILLIGRAM(S): 500 TABLET, FILM COATED ORAL at 11:11

## 2020-07-30 RX ADMIN — Medication 1 DROP(S): at 06:07

## 2020-07-30 RX ADMIN — LOSARTAN POTASSIUM 100 MILLIGRAM(S): 100 TABLET, FILM COATED ORAL at 06:06

## 2020-07-30 RX ADMIN — DORZOLAMIDE HYDROCHLORIDE TIMOLOL MALEATE 1 DROP(S): 20; 5 SOLUTION/ DROPS OPHTHALMIC at 06:07

## 2020-07-30 RX ADMIN — CEFTRIAXONE 100 MILLIGRAM(S): 500 INJECTION, POWDER, FOR SOLUTION INTRAMUSCULAR; INTRAVENOUS at 11:13

## 2020-07-30 RX ADMIN — Medication 3 MILLIGRAM(S): at 21:50

## 2020-07-30 RX ADMIN — LATANOPROST 1 DROP(S): 0.05 SOLUTION/ DROPS OPHTHALMIC; TOPICAL at 21:51

## 2020-07-30 RX ADMIN — SODIUM CHLORIDE 75 MILLILITER(S): 9 INJECTION INTRAMUSCULAR; INTRAVENOUS; SUBCUTANEOUS at 11:15

## 2020-07-30 RX ADMIN — Medication 1 DROP(S): at 06:06

## 2020-07-30 RX ADMIN — Medication 50 MILLIGRAM(S): at 17:50

## 2020-07-30 RX ADMIN — ENOXAPARIN SODIUM 40 MILLIGRAM(S): 100 INJECTION SUBCUTANEOUS at 11:14

## 2020-07-30 RX ADMIN — Medication 8: at 12:47

## 2020-07-30 RX ADMIN — Medication 10 MILLIGRAM(S): at 21:51

## 2020-07-30 RX ADMIN — TAMSULOSIN HYDROCHLORIDE 0.4 MILLIGRAM(S): 0.4 CAPSULE ORAL at 21:51

## 2020-07-30 NOTE — PROGRESS NOTE ADULT - PROBLEM SELECTOR PLAN 3
FS uncontrolled, c/w Lantus 5, add humalog 3units   ISS and Trend FS. Consistent carb diet  HbA1c 8.6  Hold oral hypoglycemic agents

## 2020-07-30 NOTE — PROGRESS NOTE ADULT - PROBLEM SELECTOR PLAN 4
Continue home medications hydralazine and losartan.    c/w lopressor given tachycardia at times  Will hold Lasix given slight bump in cr  DASH diet  Monitor blood pressure

## 2020-07-30 NOTE — PROGRESS NOTE ADULT - SUBJECTIVE AND OBJECTIVE BOX
[facility] Division of Hospital Medicine  Curtis Lua MD  Pager [phone]      Patient is a 80y old  Male who presents with a chief complaint of shortness of breath and wheezing (29 Jul 2020 12:54)      SUBJECTIVE / OVERNIGHT EVENTS: improved SOB and wheezing. +Dizziness when he stood up    MEDICATIONS  (STANDING):  artificial tears (preservative free) Ophthalmic Solution 1 Drop(s) Both EYES two times a day  azithromycin  IVPB 500 milliGRAM(s) IV Intermittent every 24 hours  azithromycin  IVPB      cefTRIAXone   IVPB      cefTRIAXone   IVPB 1000 milliGRAM(s) IV Intermittent every 24 hours  dextrose 5%. 1000 milliLiter(s) (50 mL/Hr) IV Continuous <Continuous>  dextrose 50% Injectable 12.5 Gram(s) IV Push once  dextrose 50% Injectable 25 Gram(s) IV Push once  dextrose 50% Injectable 25 Gram(s) IV Push once  dorzolamide 2%/timolol 0.5% Ophthalmic Solution 1 Drop(s) Both EYES two times a day  enoxaparin Injectable 40 milliGRAM(s) SubCutaneous daily  hydrALAZINE 10 milliGRAM(s) Oral three times a day  insulin lispro (HumaLOG) corrective regimen sliding scale   SubCutaneous three times a day before meals  insulin lispro (HumaLOG) corrective regimen sliding scale   SubCutaneous at bedtime  ketorolac 0.5% Ophthalmic Solution 1 Drop(s) Right EYE two times a day  latanoprost 0.005% Ophthalmic Solution 1 Drop(s) Both EYES at bedtime  losartan 100 milliGRAM(s) Oral daily  melatonin 3 milliGRAM(s) Oral at bedtime  metoprolol tartrate 50 milliGRAM(s) Oral two times a day  sodium chloride 0.9%. 1000 milliLiter(s) (75 mL/Hr) IV Continuous <Continuous>  tamsulosin 0.4 milliGRAM(s) Oral at bedtime    MEDICATIONS  (PRN):  albuterol/ipratropium for Nebulization. 3 milliLiter(s) Nebulizer every 6 hours PRN Wheezing  dextrose 40% Gel 15 Gram(s) Oral once PRN Blood Glucose LESS THAN 70 milliGRAM(s)/deciliter  glucagon  Injectable 1 milliGRAM(s) IntraMuscular once PRN Glucose LESS THAN 70 milligrams/deciliter      CAPILLARY BLOOD GLUCOSE      POCT Blood Glucose.: 324 mg/dL (30 Jul 2020 12:39)  POCT Blood Glucose.: 129 mg/dL (30 Jul 2020 07:50)  POCT Blood Glucose.: 107 mg/dL (29 Jul 2020 21:10)  POCT Blood Glucose.: 326 mg/dL (29 Jul 2020 17:24)    I&O's Summary    29 Jul 2020 07:01  -  30 Jul 2020 07:00  --------------------------------------------------------  IN: 730 mL / OUT: 675 mL / NET: 55 mL        PHYSICAL EXAM:  Vital Signs Last 24 Hrs  T(C): 36.5 (30 Jul 2020 06:04), Max: 36.7 (29 Jul 2020 17:12)  T(F): 97.7 (30 Jul 2020 06:04), Max: 98 (29 Jul 2020 17:12)  HR: 71 (30 Jul 2020 08:01) (71 - 103)  BP: 123/50 (30 Jul 2020 08:01) (123/50 - 154/63)  BP(mean): --  RR: 17 (30 Jul 2020 08:01) (17 - 18)  SpO2: 100% (30 Jul 2020 08:01) (98% - 100%)  CONSTITUTIONAL: NAD  EYES: conjunctiva and sclera clear  ENMT: mmm  NECK: Supple,  RESPIRATORY: improved crackles and wheezing  CARDIOVASCULAR: Regular rate and rhythm, + S1 and S2  ABDOMEN: Nontender to palpation, normoactive bowel sounds, no rebound/guarding  MUSCULOSKELETAL:  no clubbing or cyanosis of digits;   PSYCH: A+O x 3  NEUROLOGY: no gross deficits   SKIN: No rashes;     LABS:                        14.5   11.12 )-----------( 192      ( 30 Jul 2020 06:00 )             43.3     07-30    128<L>  |  89<L>  |  48<H>  ----------------------------<  101<H>  4.7   |  31  |  1.46<H>    Ca    9.1      30 Jul 2020 06:00  Phos  3.6     07-29  Mg     1.8     07-29                Culture - Blood (collected 28 Jul 2020 14:27)  Source: .Blood Blood-Venous  Preliminary Report (29 Jul 2020 15:04):    No growth to date.    Culture - Blood (collected 28 Jul 2020 14:27)  Source: .Blood Blood-Peripheral  Preliminary Report (29 Jul 2020 15:04):    No growth to date.

## 2020-07-30 NOTE — PROGRESS NOTE ADULT - PROBLEM SELECTOR PLAN 1
Likely due to dehydration and lasix use  will hold Lasix, hydrate pt and Trend Cr  if worsening in the AM, will hold losartan  SBP dropped when standing, borderline orthostatic

## 2020-07-31 VITALS
TEMPERATURE: 98 F | HEART RATE: 71 BPM | RESPIRATION RATE: 18 BRPM | DIASTOLIC BLOOD PRESSURE: 64 MMHG | SYSTOLIC BLOOD PRESSURE: 135 MMHG | OXYGEN SATURATION: 97 %

## 2020-07-31 LAB
ANION GAP SERPL CALC-SCNC: 14 MMO/L — SIGNIFICANT CHANGE UP (ref 7–14)
BUN SERPL-MCNC: 46 MG/DL — HIGH (ref 7–23)
CALCIUM SERPL-MCNC: 8.8 MG/DL — SIGNIFICANT CHANGE UP (ref 8.4–10.5)
CHLORIDE SERPL-SCNC: 92 MMOL/L — LOW (ref 98–107)
CO2 SERPL-SCNC: 24 MMOL/L — SIGNIFICANT CHANGE UP (ref 22–31)
CREAT SERPL-MCNC: 1.4 MG/DL — HIGH (ref 0.5–1.3)
GLUCOSE SERPL-MCNC: 197 MG/DL — HIGH (ref 70–99)
HCT VFR BLD CALC: 45.4 % — SIGNIFICANT CHANGE UP (ref 39–50)
HGB BLD-MCNC: 14.8 G/DL — SIGNIFICANT CHANGE UP (ref 13–17)
MCHC RBC-ENTMCNC: 29.4 PG — SIGNIFICANT CHANGE UP (ref 27–34)
MCHC RBC-ENTMCNC: 32.6 % — SIGNIFICANT CHANGE UP (ref 32–36)
MCV RBC AUTO: 90.1 FL — SIGNIFICANT CHANGE UP (ref 80–100)
NRBC # FLD: 0 K/UL — SIGNIFICANT CHANGE UP (ref 0–0)
PLATELET # BLD AUTO: 184 K/UL — SIGNIFICANT CHANGE UP (ref 150–400)
PMV BLD: 11.3 FL — SIGNIFICANT CHANGE UP (ref 7–13)
POTASSIUM SERPL-MCNC: 4.4 MMOL/L — SIGNIFICANT CHANGE UP (ref 3.5–5.3)
POTASSIUM SERPL-SCNC: 4.4 MMOL/L — SIGNIFICANT CHANGE UP (ref 3.5–5.3)
RBC # BLD: 5.04 M/UL — SIGNIFICANT CHANGE UP (ref 4.2–5.8)
RBC # FLD: 13.2 % — SIGNIFICANT CHANGE UP (ref 10.3–14.5)
SODIUM SERPL-SCNC: 130 MMOL/L — LOW (ref 135–145)
WBC # BLD: 9.23 K/UL — SIGNIFICANT CHANGE UP (ref 3.8–10.5)
WBC # FLD AUTO: 9.23 K/UL — SIGNIFICANT CHANGE UP (ref 3.8–10.5)

## 2020-07-31 PROCEDURE — 99239 HOSP IP/OBS DSCHRG MGMT >30: CPT

## 2020-07-31 RX ORDER — HYDRALAZINE HCL 50 MG
1 TABLET ORAL
Qty: 90 | Refills: 0
Start: 2020-07-31 | End: 2020-08-29

## 2020-07-31 RX ORDER — AZITHROMYCIN 500 MG/1
1 TABLET, FILM COATED ORAL
Qty: 1 | Refills: 0
Start: 2020-07-31 | End: 2020-07-31

## 2020-07-31 RX ORDER — HYDRALAZINE HCL 50 MG
0 TABLET ORAL
Qty: 0 | Refills: 0 | DISCHARGE

## 2020-07-31 RX ORDER — FUROSEMIDE 40 MG
1 TABLET ORAL
Qty: 0 | Refills: 0 | DISCHARGE

## 2020-07-31 RX ORDER — CEFUROXIME AXETIL 250 MG
1 TABLET ORAL
Qty: 4 | Refills: 0
Start: 2020-07-31 | End: 2020-08-01

## 2020-07-31 RX ADMIN — LOSARTAN POTASSIUM 100 MILLIGRAM(S): 100 TABLET, FILM COATED ORAL at 05:42

## 2020-07-31 RX ADMIN — Medication 10 MILLIGRAM(S): at 05:42

## 2020-07-31 RX ADMIN — Medication 10 MILLIGRAM(S): at 13:03

## 2020-07-31 RX ADMIN — Medication 1 DROP(S): at 05:43

## 2020-07-31 RX ADMIN — Medication 3 UNIT(S): at 12:52

## 2020-07-31 RX ADMIN — Medication 1 DROP(S): at 05:44

## 2020-07-31 RX ADMIN — Medication 2: at 08:54

## 2020-07-31 RX ADMIN — DORZOLAMIDE HYDROCHLORIDE TIMOLOL MALEATE 1 DROP(S): 20; 5 SOLUTION/ DROPS OPHTHALMIC at 05:44

## 2020-07-31 RX ADMIN — Medication 50 MILLIGRAM(S): at 05:42

## 2020-07-31 RX ADMIN — AZITHROMYCIN 255 MILLIGRAM(S): 500 TABLET, FILM COATED ORAL at 09:09

## 2020-07-31 RX ADMIN — CEFTRIAXONE 100 MILLIGRAM(S): 500 INJECTION, POWDER, FOR SOLUTION INTRAMUSCULAR; INTRAVENOUS at 09:09

## 2020-07-31 RX ADMIN — Medication 3 UNIT(S): at 08:54

## 2020-07-31 NOTE — DISCHARGE NOTE PROVIDER - CARE PROVIDER_API CALL
Primary Care Provider,   Follow up with Primary Care Provider within 1-2 week  Phone: (   )    -  Fax: (   )    -  Follow Up Time:

## 2020-07-31 NOTE — PROGRESS NOTE ADULT - ATTENDING COMMENTS
Plan for dc  Pt to follow up with pCP in 1 week for eval. Hold Lasix  pending pCP eval. Will need CT 4-6 weeks after dc  Spent 45mins on dc

## 2020-07-31 NOTE — DISCHARGE NOTE PROVIDER - NSDCMRMEDTOKEN_GEN_ALL_CORE_FT
dorzolamide-timolol 2%-0.5% preservative-free ophthalmic solution: 1 drop(s) to each affected eye 2 times a day  Flomax 0.4 mg oral capsule: 1 cap(s) orally once a day  flurbiprofen 0.03% ophthalmic solution: in the right eye once a day  glipiZIDE 10 mg oral tablet: 1 tab(s) orally once a day  hydrALAZINE 10 mg oral tablet: 1 tab(s) orally 3 times a day  ketorolac 0.5% ophthalmic solution: in the right eye 2 times a day  latanoprost 0.005% ophthalmic solution: 1 drop(s) to each affected eye once a day (in the evening)  losartan 100 mg oral tablet: 1 tab(s) orally once a day  metoprolol tartrate 50 mg oral tablet: 1 tab(s) orally 2 times a day  Refresh ophthalmic solution: to each affected eye 2 times a day azithromycin 500 mg oral tablet: 1 tab(s) orally once a day   cefuroxime 500 mg oral tablet: 1 tab(s) orally 2 times a day   dorzolamide-timolol 2%-0.5% preservative-free ophthalmic solution: 1 drop(s) to each affected eye 2 times a day  Flomax 0.4 mg oral capsule: 1 cap(s) orally once a day  flurbiprofen 0.03% ophthalmic solution: in the right eye once a day  glipiZIDE 10 mg oral tablet: 1 tab(s) orally once a day  hydrALAZINE 10 mg oral tablet: 1 tab(s) orally 3 times a day  ketorolac 0.5% ophthalmic solution: in the right eye 2 times a day  latanoprost 0.005% ophthalmic solution: 1 drop(s) to each affected eye once a day (in the evening)  losartan 100 mg oral tablet: 1 tab(s) orally once a day  metoprolol tartrate 50 mg oral tablet: 1 tab(s) orally 2 times a day  Refresh ophthalmic solution: to each affected eye 2 times a day

## 2020-07-31 NOTE — PROGRESS NOTE ADULT - PROBLEM SELECTOR PLAN 3
FS improved, c/w Lantus 5, add humalog 3units, resume po meds on dc  ISS and Trend FS. Consistent carb diet  HbA1c 8.6

## 2020-07-31 NOTE — DISCHARGE NOTE PROVIDER - PROVIDER TOKENS
FREE:[LAST:[Primary Care Provider],PHONE:[(   )    -],FAX:[(   )    -],ADDRESS:[Follow up with Primary Care Provider within 1-2 week]]

## 2020-07-31 NOTE — DISCHARGE NOTE PROVIDER - HOSPITAL COURSE
80 year old M w/ PMhx of HTN, T2DM, glaucoma admitted for shortness of breath.         ALMAS (acute kidney injury).    -Likely due to dehydration and lasix use    will hold Lasix, hydrate pt and Trend Cr    if worsening in the AM, will hold losartan    SBP dropped when standing, borderline orthostatic.          Multifocal pneumonia.  Plan: as seen on CT, c/w Abx azithromycin and ceftriaxone    Per PMD Dr. Jiang, No hx of COPD or Asthma, dc steroids    patient is euvolemic on exam, on lasix 20mg po at home,     proBNP 78. COVID negative will repeat. hst 22>>28. afebrile,     nebulizer q6hrs PRN     Ddimer normal.         Type 2 diabetes mellitus without complication, without long-term current use of insulin.    -FS uncontrolled, c/w Lantus 5, add humalog 3units     -HbA1c 8.6        Hypertension, unspecified type.    -Continue home medications hydralazine and losartan.     -Continue with Lopressor with lopressor given tachycardia at times    -Will hold Lasix given slight bump in cr        Glaucoma of right eye, unspecified glaucoma type.  Plan: continue eye drops.         VTE (venous thromboembolism).     -Lovenox 40mg daily.        7/31: 80 year old M with PMhx of HTN, T2DM, glaucoma admitted for shortness of breath.         ALMAS (acute kidney injury).    -Likely due to dehydration and lasix use    -Will hold Lasix and monitor creatinine     -Follow up with Primary care provider and monitor creatinine and discuss when to restart Lasix          Multifocal pneumonia.    -as seen on CT, c/w Abx azithromycin and ceftriaxone    -Per PMD Dr. Jiang, No hx of COPD or Asthma, dc steroids    -Patient is euvolemic on exam, on lasix 20mg po at home,     -COVID negative will repeat.    -Continue Azithromycin 500 for one more day and Ceftin 500 BID for 2 more days         Type 2 diabetes mellitus without complication, without long-term current use of insulin.    -FS uncontrolled, c/w Lantus 5, add humalog 3units     -HbA1c 8.6    -Follow up with Endo outpatient         Hypertension, unspecified type.    -Continue home medications hydralazine and losartan.     -Continue with Lopressor with lopressor given tachycardia at times        Glaucoma of right eye, unspecified glaucoma type.     -Continue Home medications         7/31: Case discussed with Dr. Lua. Patient stable for discharge. Medications reviwed and sent to agreed upon pharmacy. 80 year old M with PMhx of HTN, T2DM, glaucoma admitted for shortness of breath.         ALMAS (acute kidney injury).    -Likely due to dehydration and lasix use    -Will hold Lasix and monitor creatinine     -Follow up with Primary care provider and monitor creatinine and discuss when to restart Lasix          Multifocal pneumonia.    -as seen on CT, c/w Abx azithromycin and ceftriaxone    -Per PMD Dr. Jiang, No hx of COPD or Asthma, dc steroids    -Patient is euvolemic on exam, on lasix 20mg po at home,     -COVID negative will repeat.    -Continue Azithromycin 500 for one more day and Ceftin 500 BID for 2 more days     -Repeat CT scan in 4-6 weeks         Type 2 diabetes mellitus without complication, without long-term current use of insulin.    -FS uncontrolled, c/w Lantus 5, add humalog 3units     -HbA1c 8.6    -Follow up with Endo outpatient         Hypertension, unspecified type.    -Continue home medications hydralazine and losartan.     -Continue with Lopressor with lopressor given tachycardia at times        Glaucoma of right eye, unspecified glaucoma type.     -Continue Home medications         7/31: Case discussed with Dr. Lua. Patient stable for discharge. Medications reviwed and sent to agreed upon pharmacy. 80 year old M with PMhx of HTN, T2DM, glaucoma admitted for shortness of breath. Pt admitted for pNA. Pt symptoms improved with Abx. Pt also had ALMAS, improved with fluids, lasix was held.     -Follow up with Primary care provider and monitor creatinine and discuss when to restart Lasix      - Pt to repeat CT scan in 4-6 weeks after discharge            7/31: Case discussed with Dr. Lua. Patient stable for discharge. Medications reviwed and sent to agreed upon pharmacy.

## 2020-07-31 NOTE — PROGRESS NOTE ADULT - SUBJECTIVE AND OBJECTIVE BOX
[facility] Division of Hospital Medicine  Curtis Lua MD  Pager [phone]      Patient is a 80y old  Male who presents with a chief complaint of shortness of breath and wheezing (31 Jul 2020 11:14)      SUBJECTIVE / OVERNIGHT EVENTS: Dizziness is resolved, no CP or SOB    MEDICATIONS  (STANDING):  artificial tears (preservative free) Ophthalmic Solution 1 Drop(s) Both EYES two times a day  azithromycin  IVPB 500 milliGRAM(s) IV Intermittent every 24 hours  azithromycin  IVPB      cefTRIAXone   IVPB      cefTRIAXone   IVPB 1000 milliGRAM(s) IV Intermittent every 24 hours  dextrose 5%. 1000 milliLiter(s) (50 mL/Hr) IV Continuous <Continuous>  dextrose 50% Injectable 12.5 Gram(s) IV Push once  dextrose 50% Injectable 25 Gram(s) IV Push once  dextrose 50% Injectable 25 Gram(s) IV Push once  dorzolamide 2%/timolol 0.5% Ophthalmic Solution 1 Drop(s) Both EYES two times a day  enoxaparin Injectable 40 milliGRAM(s) SubCutaneous daily  hydrALAZINE 10 milliGRAM(s) Oral three times a day  insulin lispro (HumaLOG) corrective regimen sliding scale   SubCutaneous three times a day before meals  insulin lispro (HumaLOG) corrective regimen sliding scale   SubCutaneous at bedtime  insulin lispro Injectable (HumaLOG) 3 Unit(s) SubCutaneous three times a day before meals  ketorolac 0.5% Ophthalmic Solution 1 Drop(s) Right EYE two times a day  latanoprost 0.005% Ophthalmic Solution 1 Drop(s) Both EYES at bedtime  losartan 100 milliGRAM(s) Oral daily  melatonin 3 milliGRAM(s) Oral at bedtime  metoprolol tartrate 50 milliGRAM(s) Oral two times a day  sodium chloride 0.9%. 1000 milliLiter(s) (75 mL/Hr) IV Continuous <Continuous>  tamsulosin 0.4 milliGRAM(s) Oral at bedtime    MEDICATIONS  (PRN):  albuterol/ipratropium for Nebulization. 3 milliLiter(s) Nebulizer every 6 hours PRN Wheezing  dextrose 40% Gel 15 Gram(s) Oral once PRN Blood Glucose LESS THAN 70 milliGRAM(s)/deciliter  glucagon  Injectable 1 milliGRAM(s) IntraMuscular once PRN Glucose LESS THAN 70 milligrams/deciliter      CAPILLARY BLOOD GLUCOSE      POCT Blood Glucose.: 124 mg/dL (31 Jul 2020 12:14)  POCT Blood Glucose.: 186 mg/dL (31 Jul 2020 08:20)  POCT Blood Glucose.: 187 mg/dL (31 Jul 2020 07:11)  POCT Blood Glucose.: 158 mg/dL (30 Jul 2020 22:07)  POCT Blood Glucose.: 114 mg/dL (30 Jul 2020 17:34)    I&O's Summary    30 Jul 2020 07:01  -  31 Jul 2020 07:00  --------------------------------------------------------  IN: 0 mL / OUT: 325 mL / NET: -325 mL    31 Jul 2020 07:01  -  31 Jul 2020 16:26  --------------------------------------------------------  IN: 0 mL / OUT: 400 mL / NET: -400 mL        PHYSICAL EXAM:  Vital Signs Last 24 Hrs  T(C): 36.9 (31 Jul 2020 12:49), Max: 36.9 (31 Jul 2020 12:49)  T(F): 98.4 (31 Jul 2020 12:49), Max: 98.4 (31 Jul 2020 12:49)  HR: 71 (31 Jul 2020 12:49) (70 - 83)  BP: 135/64 (31 Jul 2020 12:49) (103/57 - 152/73)  BP(mean): --  RR: 18 (31 Jul 2020 12:49) (17 - 18)  SpO2: 97% (31 Jul 2020 12:49) (96% - 99%)    CONSTITUTIONAL: NAD  EYES: conjunctiva and sclera clear  ENMT: mmm  NECK: Supple,  RESPIRATORY: Normal respiratory effort; lungs are clear to auscultation bilaterally  CARDIOVASCULAR: Regular rate and rhythm, + S1 and S2  ABDOMEN: Nontender to palpation, normoactive bowel sounds, no rebound/guarding  MUSCULOSKELETAL:  no clubbing or cyanosis of digits;   PSYCH: A+O x 3  NEUROLOGY: no gross deficits   SKIN: No rashes;     LABS:                        14.8   9.23  )-----------( 184      ( 31 Jul 2020 07:12 )             45.4     07-31    130<L>  |  92<L>  |  46<H>  ----------------------------<  197<H>  4.4   |  24  |  1.40<H>    Ca    8.8      31 Jul 2020 07:12

## 2020-07-31 NOTE — PROGRESS NOTE ADULT - PROBLEM SELECTOR PLAN 2
as seen on CT, c/w Abx azithromycin and ceftriaxone, will transiiton to po on dc  Repeat CT in 4-6 weeks  Per PMD Dr. Jiang, No hx of COPD or Asthma, dc steroids  patient is euvolemic on exam, on lasix 20mg po at home,   proBNP 78. COVID negative will repeat. hst 22>>28. afebrile,   nebulizer q6hrs PRN   Ddimer normal

## 2020-07-31 NOTE — DISCHARGE NOTE NURSING/CASE MANAGEMENT/SOCIAL WORK - PATIENT PORTAL LINK FT
You can access the FollowMyHealth Patient Portal offered by [facility] by registering at the following website: [email or web address]. By joining [employer]’s FollowMyHealth portal, you will also be able to view your health information using other applications (apps) compatible with our system.

## 2020-07-31 NOTE — PROGRESS NOTE ADULT - PROBLEM SELECTOR PLAN 1
Likely due to dehydration and lasix use  Cr improved with hydration. Hold Cr outpt till f/u with PCP, discussed with pt and daughter via phone  c/w Losartan

## 2020-07-31 NOTE — DISCHARGE NOTE PROVIDER - NSDCCPCAREPLAN_GEN_ALL_CORE_FT
PRINCIPAL DISCHARGE DIAGNOSIS  Diagnosis: Multifocal pneumonia  Assessment and Plan of Treatment: Patient is euvolemic on exam, on lasix 20mg po at home. COVID negative will repeat. Continue Azithromycin and Ceftriaxone for 1 more day. Repeat CT scan in 4-6 weeks.      SECONDARY DISCHARGE DIAGNOSES  Diagnosis: Glaucoma of right eye, unspecified glaucoma type  Assessment and Plan of Treatment: Continue Home medication    Diagnosis: Hypertension, unspecified type  Assessment and Plan of Treatment: Low sodium and fat diet, continue anti-hypertensive medications, and follow up with primary care physician.      Diagnosis: Type 2 diabetes mellitus without complication, without long-term current use of insulin  Assessment and Plan of Treatment: Monitor finger sticks pre-meal and bedtime, low salt, fat and carbohydrate diet, minimize glucose intake.  Exercise daily for at least 30 minutes and weight loss.  Follow up with primary care physician and endocrinologist for routine Hemoglobin A1C checks and management.  Follow up with your ophthalmologist for routine yearly vision exams.      Diagnosis: ALMAS (acute kidney injury)  Assessment and Plan of Treatment: Likely due to dehydration and Lasix use. Will hold Lasix. Follow up outpatient, monitor creatinine and discuss with Primary Care Provider when to restart Lasix. PRINCIPAL DISCHARGE DIAGNOSIS  Diagnosis: Multifocal pneumonia  Assessment and Plan of Treatment: Patient is euvolemic on exam, on lasix 20mg po at home. COVID negative will repeat. Continue Azithromycin 500 for one more day and Ceftin 500 BID for 2 more days. Repeat CT scan in 4-6 weeks         SECONDARY DISCHARGE DIAGNOSES  Diagnosis: Glaucoma of right eye, unspecified glaucoma type  Assessment and Plan of Treatment: Continue Home medication    Diagnosis: Hypertension, unspecified type  Assessment and Plan of Treatment: Low sodium and fat diet, continue anti-hypertensive medications, and follow up with primary care physician.      Diagnosis: Type 2 diabetes mellitus without complication, without long-term current use of insulin  Assessment and Plan of Treatment: Monitor finger sticks pre-meal and bedtime, low salt, fat and carbohydrate diet, minimize glucose intake.  Exercise daily for at least 30 minutes and weight loss.  Follow up with primary care physician and endocrinologist for routine Hemoglobin A1C checks and management.  Follow up with your ophthalmologist for routine yearly vision exams.      Diagnosis: ALMAS (acute kidney injury)  Assessment and Plan of Treatment: Likely due to dehydration and Lasix use. Will hold Lasix. Follow up outpatient, monitor creatinine and discuss with Primary Care Provider when to restart Lasix.

## 2020-08-01 LAB — L PNEUMO AG UR QL: NEGATIVE — SIGNIFICANT CHANGE UP

## 2020-08-02 LAB
CULTURE RESULTS: SIGNIFICANT CHANGE UP
CULTURE RESULTS: SIGNIFICANT CHANGE UP
SPECIMEN SOURCE: SIGNIFICANT CHANGE UP
SPECIMEN SOURCE: SIGNIFICANT CHANGE UP

## 2021-06-29 NOTE — ED PROVIDER NOTE - TOBACCO USE
Outpatient Physical Therapy Ortho Treatment Note  [facility]     Patient Name: Chrissy Dawson  : 1950  MRN: [id number]  Today's Date: 2021      Visit Date: 2021     ATTENDANCE: 10/11 (eval+12 approved until 2021)  SUBJECTIVE IMPROVEMENT: 70%  NEXT MD APPOINTMENT: 2021  RECERT DATE: 2021     THERAPY DIAGNOSIS: R total hip revision 2021    Visit Dx:    ICD-10-CM ICD-9-CM   1. 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 S/P hip replacement, right  Z96.641 V43.64   3. Impaired mobility and activities of daily living  Z74.09 V49.89    Z78.9    4. 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)   • S/P hip replacement, right   • Overweight (BMI 25.0-29.9)   • Heart murmur   • Status post revision of total hip replacement   • Pain of right lower extremity   • Trochanteric bursitis of right hip        Past Medical History:   Diagnosis Date   • Backache    • Benign essential hypertension     PATIENT DENIES   • Cervical arthritis    • Coronary arteriosclerosis    • Fibrocystic breast    • Ganglion cyst of wrist     Ganglion/synovial cyst - wrist   • Ganglion of wrist    • Hip pain    • History of echocardiogram 10/23/2015    Echocardiogram W/ color flow 83643 (1) - Normal LV function with Ef of 55-60%.Normal RV size and function.Pseudonormal diastolic dysfunciton with borderline CLVH.NO sig.valvular regurg or stenosis.   • History of mammogram 09/10/2014    MAMMOGRAM SCREENING 59382 - [facility] (1) - [facility] ([facility])    • History of transfusion    • Hyperlipidemia    • Inguinal pain    • Recurrent angina status post coronary artery bypass graft (CMS/HCC)     Recurrent angina after coronary artery bypass graft   • Urge incontinence of urine         Past Surgical History:   Procedure Laterality Date   • BREAST BIOPSY Right 2/2/2017    Procedure: RIGHT BREAST LUMPECTOMY WITH NEEDLE LOCALIZATION AND ULTRASOUND;  Surgeon: Delfino Greer MD;  Location: [facility];  Service:    • BREAST CYST ASPIRATION     • CARDIAC SURGERY     • CORONARY ARTERY BYPASS GRAFT  2000    CABG, arterial, single (1)   • GANGLION CYST EXCISION     • JOINT REPLACEMENT     • ORIF MANDIBULAR FRACTURE     • TOTAL ABDOMINAL HYSTERECTOMY     • TOTAL ABDOMINAL HYSTERECTOMY WITH SALPINGO OOPHORECTOMY  1998    SALVADOR/BSO (1)   • TOTAL HIP ARTHROPLASTY  2011    Total hip replacement (3)   • TOTAL HIP ARTHROPLASTY REVISION Right 5/19/2021    Procedure: anterior revision total hip arthroplasty        (lg-head c-arm and hana table );  Surgeon: Jeovanny Maravilla MD;  Location: [facility];  Service: Orthopedics;  Laterality: Right;       PT Ortho     Row Name 06/29/21 0800       Precautions and Contraindications    Precautions/Limitations  no known precautions/limitations  -    Precautions  per MD: slowly progress as tolerated.   -       Posture/Observations    Posture/Observations Comments  improved cadance with SPC, mild trendelenberg  -      User Key  (r) = Recorded By, (t) = Taken By, (c) = Cosigned By    Initials Name Provider Type    Nadiya Velazquez PTA Physical Therapy Assistant                      PT Assessment/Plan     Row Name 06/29/21 0800          PT Assessment    Assessment Comments  pt with much better tolerance to PT this date; No increased pain throughout treatment but still has some fatigue; Gait improving with cadance, stride and sequence;   -NARINDER        PT Plan    PT Frequency  2x/week  -NARINDER     Predicted Duration of Therapy Intervention (PT)  6-8 weeks  " -KH     PT Plan Comments  3 more approved visits; Recheck next week, continue to progress slowly with strength as able.   -KH       User Key  (r) = Recorded By, (t) = Taken By, (c) = Cosigned By    Initials Name Provider Type    Nadiya Velazquez PTA Physical Therapy Assistant            OP Exercises     Row Name 06/29/21 0800             Subjective Comments    Subjective Comments  Patient reports that she is doing better; Alternating between her walker and her cane at home; REports that she is doing her exercises 2-3 times a days and they seem to be becoming easier;  Continues to take her pain meds;   -KH         Subjective Pain    Able to rate subjective pain?  yes  -KH      Pre-Treatment Pain Level  2 took medication prior to coming;   -KH      Post-Treatment Pain Level  -- \"better\"  -KH         Total Minutes    45957 - PT Therapeutic Exercise Minutes  38  -KH         Exercise 1    Exercise Name 1  pro ll LE ROM/Strength  -KH      Time 1  10 mins  -KH      Additional Comments  level 2; seat 13  -KH         Exercise 2    Exercise Name 2  standing hamstring stretch R  -KH      Sets 2  3  -KH      Time 2  30\" holds  -KH         Exercise 3    Exercise Name 3  step ups fwd  -KH      Sets 3  2  -KH      Reps 3  10  -KH      Additional Comments  4\" step   -KH         Exercise 4    Exercise Name 4  lateral step ups   -KH      Sets 4  2  -KH      Reps 4  10  -KH      Additional Comments  4\" step   -KH         Exercise 5    Exercise Name 5  CR/TR  -KH      Cueing 5  Verbal  -KH      Sets 5  2  -KH      Reps 5  10  -KH         Exercise 6    Exercise Name 6  side stepping at steps for UE assist  -KH      Reps 6  [id number]  -KH         Exercise 7    Exercise Name 7  LAQ  -KH      Cueing 7  Verbal  -KH      Sets 7  2  -KH      Reps 7  15  -KH      Additional Comments  11# aw on R ankle   -KH        User Key  (r) = Recorded By, (t) = Taken By, (c) = Cosigned By    Initials Name Provider Type    Nadiya Velazquez PTA Physical Therapy " Assistant                       PT OP Goals     Row Name 06/29/21 0800          PT Short Term Goals    STG Date to Achieve  06/22/21  -     STG 1  Pt is independet with HEP.   -     STG 1 Progress  Met;Ongoing  -     STG 2  Patient is able to complete 5 times sit to stand from standard height chair.   -     STG 2 Progress  Met  -     STG 3  Patient is able to complete 6-minute walk test.  -     STG 3 Progress  Met  -     STG 4  Patient is able to complete right hip flexion to 90.   -     STG 4 Progress  Not Met  -     STG 5  Patient is able to complete straight leg raise x5 on right lower extremity.  -     STG 5 Progress  Met  -        Long Term Goals    LTG Date to Achieve  07/20/21  -     LTG 1  6-minute walk test 1000 feet or greater.  -     LTG 1 Progress  Not Met  -     LTG 2  5 times sit to stand from standard height chair in </=15 seconds  -     LTG 2 Progress  Goal Revised;Not Met  -     LTG 3  LEFS improved to 40/80 or greater.   -     LTG 3 Progress  Met  -     LTG 4  Pt is able to ambulate 270 ft without AD and without LOB.   -     LTG 4 Progress  Not Met  -     LTG 5  Pt notes subjective improvment 80% or greater.   -     LTG 5 Progress  Not Met  -        Time Calculation    PT Goal Re-Cert Due Date  07/08/21  -       User Key  (r) = Recorded By, (t) = Taken By, (c) = Cosigned By    Initials Name Provider Type    Nadiya Velazquez PTA Physical Therapy Assistant                         Time Calculation:   Start Time: 0855  Stop Time: 0933  Time Calculation (min): 38 min  Timed Charges  45833 - PT Therapeutic Exercise Minutes: 38  Total Minutes  Timed Charges Total Minutes: 38   Total Minutes: 38  Therapy Charges for Today     Code Description Service Date Service Provider Modifiers Qty    [id number] HC PT THER PROC EA 15 MIN 6/29/2021 Nadiya Kitchen PTA GP 3                    Nadiya Kitchen PTA  6/29/2021      Former smoker

## 2022-11-18 NOTE — H&P ADULT - GENITOURINARY
details…
PAST MEDICAL HISTORY:  Alopecia (Capitis) Totalis wears wig    Diverticulitis of Large Intestine on and off since 2003. Tx with Abx and bowel rest on prior exacerbations    Diverticulosis of the Colon     H/O: Hypothyroidism in remote past, no longer needs med    History of Obesity     HTN (Hypertension)

## 2023-01-10 ENCOUNTER — EMERGENCY (EMERGENCY)
Facility: HOSPITAL | Age: 83
LOS: 1 days | Discharge: HOME | End: 2023-01-10
Attending: EMERGENCY MEDICINE | Admitting: EMERGENCY MEDICINE
Payer: MEDICARE

## 2023-01-10 VITALS
SYSTOLIC BLOOD PRESSURE: 172 MMHG | HEART RATE: 68 BPM | DIASTOLIC BLOOD PRESSURE: 75 MMHG | RESPIRATION RATE: 16 BRPM | OXYGEN SATURATION: 99 %

## 2023-01-10 VITALS
OXYGEN SATURATION: 100 % | HEART RATE: 61 BPM | SYSTOLIC BLOOD PRESSURE: 209 MMHG | DIASTOLIC BLOOD PRESSURE: 76 MMHG | TEMPERATURE: 99 F | RESPIRATION RATE: 16 BRPM

## 2023-01-10 PROBLEM — I10 ESSENTIAL (PRIMARY) HYPERTENSION: Chronic | Status: ACTIVE | Noted: 2020-07-28

## 2023-01-10 PROBLEM — E11.9 TYPE 2 DIABETES MELLITUS WITHOUT COMPLICATIONS: Chronic | Status: ACTIVE | Noted: 2020-07-28

## 2023-01-10 LAB
ALBUMIN SERPL ELPH-MCNC: 4.1 G/DL — SIGNIFICANT CHANGE UP (ref 3.3–5)
ALP SERPL-CCNC: 78 U/L — SIGNIFICANT CHANGE UP (ref 40–120)
ALT FLD-CCNC: 29 U/L — SIGNIFICANT CHANGE UP (ref 4–41)
ANION GAP SERPL CALC-SCNC: 12 MMOL/L — SIGNIFICANT CHANGE UP (ref 7–14)
AST SERPL-CCNC: 39 U/L — SIGNIFICANT CHANGE UP (ref 4–40)
BASOPHILS # BLD AUTO: 0.03 K/UL — SIGNIFICANT CHANGE UP (ref 0–0.2)
BASOPHILS NFR BLD AUTO: 0.2 % — SIGNIFICANT CHANGE UP (ref 0–2)
BILIRUB SERPL-MCNC: 1 MG/DL — SIGNIFICANT CHANGE UP (ref 0.2–1.2)
BUN SERPL-MCNC: 22 MG/DL — SIGNIFICANT CHANGE UP (ref 7–23)
CALCIUM SERPL-MCNC: 9.3 MG/DL — SIGNIFICANT CHANGE UP (ref 8.4–10.5)
CHLORIDE SERPL-SCNC: 93 MMOL/L — LOW (ref 98–107)
CO2 SERPL-SCNC: 24 MMOL/L — SIGNIFICANT CHANGE UP (ref 22–31)
CREAT SERPL-MCNC: 0.82 MG/DL — SIGNIFICANT CHANGE UP (ref 0.5–1.3)
EGFR: 88 ML/MIN/1.73M2 — SIGNIFICANT CHANGE UP
EOSINOPHIL # BLD AUTO: 0.24 K/UL — SIGNIFICANT CHANGE UP (ref 0–0.5)
EOSINOPHIL NFR BLD AUTO: 1.7 % — SIGNIFICANT CHANGE UP (ref 0–6)
GLUCOSE SERPL-MCNC: 159 MG/DL — HIGH (ref 70–99)
HCT VFR BLD CALC: 45 % — SIGNIFICANT CHANGE UP (ref 39–50)
HGB BLD-MCNC: 14.8 G/DL — SIGNIFICANT CHANGE UP (ref 13–17)
IANC: 10.7 K/UL — HIGH (ref 1.8–7.4)
IMM GRANULOCYTES NFR BLD AUTO: 0.5 % — SIGNIFICANT CHANGE UP (ref 0–0.9)
LIDOCAIN IGE QN: 108 U/L — HIGH (ref 7–60)
LYMPHOCYTES # BLD AUTO: 1.94 K/UL — SIGNIFICANT CHANGE UP (ref 1–3.3)
LYMPHOCYTES # BLD AUTO: 13.5 % — SIGNIFICANT CHANGE UP (ref 13–44)
MCHC RBC-ENTMCNC: 29.5 PG — SIGNIFICANT CHANGE UP (ref 27–34)
MCHC RBC-ENTMCNC: 32.9 GM/DL — SIGNIFICANT CHANGE UP (ref 32–36)
MCV RBC AUTO: 89.6 FL — SIGNIFICANT CHANGE UP (ref 80–100)
MONOCYTES # BLD AUTO: 1.39 K/UL — HIGH (ref 0–0.9)
MONOCYTES NFR BLD AUTO: 9.7 % — SIGNIFICANT CHANGE UP (ref 2–14)
NEUTROPHILS # BLD AUTO: 10.7 K/UL — HIGH (ref 1.8–7.4)
NEUTROPHILS NFR BLD AUTO: 74.4 % — SIGNIFICANT CHANGE UP (ref 43–77)
NRBC # BLD: 0 /100 WBCS — SIGNIFICANT CHANGE UP (ref 0–0)
NRBC # FLD: 0 K/UL — SIGNIFICANT CHANGE UP (ref 0–0)
PLATELET # BLD AUTO: 186 K/UL — SIGNIFICANT CHANGE UP (ref 150–400)
POTASSIUM SERPL-MCNC: 5.3 MMOL/L — SIGNIFICANT CHANGE UP (ref 3.5–5.3)
POTASSIUM SERPL-SCNC: 5.3 MMOL/L — SIGNIFICANT CHANGE UP (ref 3.5–5.3)
PROT SERPL-MCNC: 8 G/DL — SIGNIFICANT CHANGE UP (ref 6–8.3)
RBC # BLD: 5.02 M/UL — SIGNIFICANT CHANGE UP (ref 4.2–5.8)
RBC # FLD: 13.8 % — SIGNIFICANT CHANGE UP (ref 10.3–14.5)
SODIUM SERPL-SCNC: 129 MMOL/L — LOW (ref 135–145)
WBC # BLD: 14.37 K/UL — HIGH (ref 3.8–10.5)
WBC # FLD AUTO: 14.37 K/UL — HIGH (ref 3.8–10.5)

## 2023-01-10 PROCEDURE — 99285 EMERGENCY DEPT VISIT HI MDM: CPT

## 2023-01-10 PROCEDURE — 74177 CT ABD & PELVIS W/CONTRAST: CPT | Mod: 26,QQ

## 2023-01-10 RX ORDER — ACETAMINOPHEN 500 MG
1000 TABLET ORAL ONCE
Refills: 0 | Status: COMPLETED | OUTPATIENT
Start: 2023-01-10 | End: 2023-01-10

## 2023-01-10 RX ORDER — HYDRALAZINE HCL 50 MG
10 TABLET ORAL ONCE
Refills: 0 | Status: COMPLETED | OUTPATIENT
Start: 2023-01-10 | End: 2023-01-10

## 2023-01-10 RX ADMIN — Medication 400 MILLIGRAM(S): at 15:34

## 2023-01-10 RX ADMIN — Medication 10 MILLIGRAM(S): at 17:31

## 2023-01-10 NOTE — ED PROVIDER NOTE - PATIENT PORTAL LINK FT
You can access the FollowMyHealth Patient Portal offered by [facility] by registering at the following website: [email or web address]. By joining [employer]’s FollowMyHealth portal, you will also be able to view your health information using other applications (apps) compatible with our system.

## 2023-01-10 NOTE — ED ADULT TRIAGE NOTE - CHIEF COMPLAINT QUOTE
Pt sent by PMD for 3 days of mid abdominal pain and elevated blood pressure. Pt denies N/V, denies diarrhea. Denies chest pain, c/o dizziness. Reports being complaint with blood pressure medications with no recent dosage changes.

## 2023-01-10 NOTE — ED ADULT NURSE REASSESSMENT NOTE - NS ED NURSE REASSESS COMMENT FT1
MD made aware of patient blood pressure. Patient awaiting for results and get scanned. Updated on plan of care.
Pt resting in bed comfortably. Pt medicated as per MAR. Denies current abdominal pain. Pt updated on plan of care

## 2023-01-10 NOTE — ED PROVIDER NOTE - NSFOLLOWUPCLINICS_GEN_ALL_ED_FT
[facility]  Urology  [address] [place] [address] - [address] [place] [address]  Phone: [phone]  Fax:   Follow Up Time: Routine

## 2023-01-10 NOTE — ED PROVIDER NOTE - OBJECTIVE STATEMENT
82M with hx of HTN, diabetes presenting with abdominal pain x 3 days. Diffuse constant cramping pain. Denies fevers, chills, nausea, vomiting, diarrhea, constipation. Went to PMD today and instructed to come to ER for imaging. Has been taking tylenol with minimal relief, last dose last night. No previous abdominal surgeries. Today blood pressure readings were in systolic 200s. Denies headache, dizziness, changes in vision.

## 2023-01-10 NOTE — ED PROVIDER NOTE - PHYSICAL EXAMINATION
Gen: NAD, AAOx3, non-toxic appearing  HEENT: NCAT, normal conjunctiva, oral mucosa moist  Lung: speaking in full sentences, good aeration bilaterally, lungs CTA b/l  CV: regular rate and rhythm. cap refill <2x. peripheral pulses 2+bilaterally   Abd: soft, ND, diffuse tenderness, no rebound or guarding  MSK: no visible deformities  Neuro: No focal deficits  Skin: Intact  Psych: normal affect

## 2023-01-10 NOTE — ED ADULT NURSE NOTE - SUICIDE SCREENING QUESTION 3
Pt reports to clinic today for labs and team rn check.    bp wnl, pt feeling well some fatigue but manageable.     No

## 2023-01-10 NOTE — ED ADULT NURSE NOTE - OBJECTIVE STATEMENT
Pt received in spot 3B A&Ox4, ambulatory at [facility]. Pt is an 81 y/o M with PMH of HTN, DMT2, presents to ED with c/o abdominal pain. Pt reports abdominal pain started about 3 days ago. Pt reports pain is a cramping feeling. Pt denies pain radiating, cp, shoulder pain, SOB, n/v. Pt reports taking some Tylenol with minimal relief of symptoms. Respirations even and unlabored, abdomen soft, nontender, skin intact, pt hypertensive, MD made aware. 20 G IV placed in left AC. Labs drawn and sent as per provider orders. Pt medicated as per MAR. Awaiting further orders

## 2023-01-10 NOTE — ED PROVIDER NOTE - CLINICAL SUMMARY MEDICAL DECISION MAKING FREE TEXT BOX
82M with hx of HTN, diabetes p/w diffuse abd pain. Poorly localized tenderness, soft nondistended abdomen. No tearing pain, focal weakness, focal neuro deficits. Broad differential including colitis vs diverticulitis vs pancreatitis vs less likely appendicitis. Plan for labs, pain control, CTAP, and dispo pending workup.

## 2023-01-10 NOTE — ED PROVIDER NOTE - PROGRESS NOTE DETAILS
Phong PGY4: Labs and imaging nonactionable, CT showing an indeterminate lesion on the kidney.  Patient informed.  Understands and will follow up with urology.  Follow-up given discharge paperwork.  Patient will also follow-up with PCP.  Patient reassessed no more abdominal pain.  Will DC with strict return precautions.

## 2023-07-25 NOTE — H&P ADULT - GASTROINTESTINAL DETAILS
DVT: holding pending TLC placement  Diet: Regular. DVT: SQ lovenox hold plts <50   Diet: Regular. no masses palpable/no distention/nontender/no bruit/bowel sounds normal/normal/soft

## 2023-09-07 NOTE — ED ADULT NURSE NOTE - NSIMPLEMENTINTERV_GEN_ALL_ED
oral hygiene/position upright (90Y)/cough/gurgly voice/fever/throat clearing Implemented All Universal Safety Interventions:  [place] to call system. Call bell, personal items and telephone within reach. Instruct patient to call for assistance. Room bathroom lighting operational. Non-slip footwear when patient is off stretcher. Physically safe environment: no spills, clutter or unnecessary equipment. Stretcher in lowest position, wheels locked, appropriate side rails in place.

## 2024-02-29 ENCOUNTER — OUTPATIENT (OUTPATIENT)
Dept: OUTPATIENT SERVICES | Facility: HOSPITAL | Age: 84
LOS: 1 days | End: 2024-02-29
Payer: COMMERCIAL

## 2024-02-29 VITALS
DIASTOLIC BLOOD PRESSURE: 91 MMHG | SYSTOLIC BLOOD PRESSURE: 161 MMHG | TEMPERATURE: 97 F | OXYGEN SATURATION: 98 % | RESPIRATION RATE: 16 BRPM | HEART RATE: 78 BPM

## 2024-02-29 VITALS
OXYGEN SATURATION: 99 % | RESPIRATION RATE: 18 BRPM | SYSTOLIC BLOOD PRESSURE: 179 MMHG | HEART RATE: 66 BPM | DIASTOLIC BLOOD PRESSURE: 81 MMHG

## 2024-02-29 DIAGNOSIS — Z98.890 OTHER SPECIFIED POSTPROCEDURAL STATES: Chronic | ICD-10-CM

## 2024-02-29 DIAGNOSIS — R94.39 ABNORMAL RESULT OF OTHER CARDIOVASCULAR FUNCTION STUDY: ICD-10-CM

## 2024-02-29 LAB
ANION GAP SERPL CALC-SCNC: 11 MMOL/L — SIGNIFICANT CHANGE UP (ref 5–17)
BUN SERPL-MCNC: 19 MG/DL — SIGNIFICANT CHANGE UP (ref 7–23)
CALCIUM SERPL-MCNC: 9.2 MG/DL — SIGNIFICANT CHANGE UP (ref 8.4–10.5)
CHLORIDE SERPL-SCNC: 98 MMOL/L — SIGNIFICANT CHANGE UP (ref 96–108)
CO2 SERPL-SCNC: 26 MMOL/L — SIGNIFICANT CHANGE UP (ref 22–31)
CREAT SERPL-MCNC: 0.98 MG/DL — SIGNIFICANT CHANGE UP (ref 0.5–1.3)
EGFR: 77 ML/MIN/1.73M2 — SIGNIFICANT CHANGE UP
GLUCOSE BLDC GLUCOMTR-MCNC: 101 MG/DL — HIGH (ref 70–99)
GLUCOSE BLDC GLUCOMTR-MCNC: 82 MG/DL — SIGNIFICANT CHANGE UP (ref 70–99)
GLUCOSE SERPL-MCNC: 112 MG/DL — HIGH (ref 70–99)
HCT VFR BLD CALC: 42.2 % — SIGNIFICANT CHANGE UP (ref 39–50)
HGB BLD-MCNC: 14.2 G/DL — SIGNIFICANT CHANGE UP (ref 13–17)
MCHC RBC-ENTMCNC: 30.3 PG — SIGNIFICANT CHANGE UP (ref 27–34)
MCHC RBC-ENTMCNC: 33.6 GM/DL — SIGNIFICANT CHANGE UP (ref 32–36)
MCV RBC AUTO: 90 FL — SIGNIFICANT CHANGE UP (ref 80–100)
NRBC # BLD: 0 /100 WBCS — SIGNIFICANT CHANGE UP (ref 0–0)
PLATELET # BLD AUTO: 207 K/UL — SIGNIFICANT CHANGE UP (ref 150–400)
POTASSIUM SERPL-MCNC: 3.8 MMOL/L — SIGNIFICANT CHANGE UP (ref 3.5–5.3)
POTASSIUM SERPL-SCNC: 3.8 MMOL/L — SIGNIFICANT CHANGE UP (ref 3.5–5.3)
RBC # BLD: 4.69 M/UL — SIGNIFICANT CHANGE UP (ref 4.2–5.8)
RBC # FLD: 12.7 % — SIGNIFICANT CHANGE UP (ref 10.3–14.5)
SODIUM SERPL-SCNC: 135 MMOL/L — SIGNIFICANT CHANGE UP (ref 135–145)
WBC # BLD: 6.02 K/UL — SIGNIFICANT CHANGE UP (ref 3.8–10.5)
WBC # FLD AUTO: 6.02 K/UL — SIGNIFICANT CHANGE UP (ref 3.8–10.5)

## 2024-02-29 PROCEDURE — C1874: CPT

## 2024-02-29 PROCEDURE — C1753: CPT

## 2024-02-29 PROCEDURE — 92978 ENDOLUMINL IVUS OCT C 1ST: CPT | Mod: LD

## 2024-02-29 PROCEDURE — 80048 BASIC METABOLIC PNL TOTAL CA: CPT

## 2024-02-29 PROCEDURE — 93005 ELECTROCARDIOGRAM TRACING: CPT

## 2024-02-29 PROCEDURE — C9600: CPT | Mod: LD

## 2024-02-29 PROCEDURE — 82962 GLUCOSE BLOOD TEST: CPT

## 2024-02-29 PROCEDURE — C1887: CPT

## 2024-02-29 PROCEDURE — 85027 COMPLETE CBC AUTOMATED: CPT

## 2024-02-29 PROCEDURE — 93010 ELECTROCARDIOGRAM REPORT: CPT | Mod: 76

## 2024-02-29 PROCEDURE — 92921: CPT | Mod: LD

## 2024-02-29 PROCEDURE — C1894: CPT

## 2024-02-29 PROCEDURE — C1769: CPT

## 2024-02-29 PROCEDURE — 93458 L HRT ARTERY/VENTRICLE ANGIO: CPT | Mod: 59

## 2024-02-29 PROCEDURE — C1725: CPT

## 2024-02-29 RX ORDER — HYDRALAZINE HCL 50 MG
1 TABLET ORAL
Refills: 0 | DISCHARGE

## 2024-02-29 RX ORDER — FLUDROCORTISONE ACETATE 0.1 MG/1
1 TABLET ORAL
Refills: 0 | DISCHARGE

## 2024-02-29 RX ORDER — ASPIRIN/CALCIUM CARB/MAGNESIUM 324 MG
81 TABLET ORAL DAILY
Refills: 0 | Status: DISCONTINUED | OUTPATIENT
Start: 2024-03-01 | End: 2024-03-14

## 2024-02-29 RX ORDER — METOPROLOL TARTRATE 50 MG
1 TABLET ORAL
Refills: 0 | DISCHARGE

## 2024-02-29 RX ORDER — DORZOLAMIDE HYDROCHLORIDE TIMOLOL MALEATE 20; 5 MG/ML; MG/ML
1 SOLUTION/ DROPS OPHTHALMIC
Qty: 0 | Refills: 0 | DISCHARGE

## 2024-02-29 RX ORDER — ASPIRIN/CALCIUM CARB/MAGNESIUM 324 MG
1 TABLET ORAL
Refills: 0 | DISCHARGE

## 2024-02-29 RX ORDER — HYDRALAZINE HCL 50 MG
10 TABLET ORAL ONCE
Refills: 0 | Status: COMPLETED | OUTPATIENT
Start: 2024-02-29 | End: 2024-02-29

## 2024-02-29 RX ORDER — LOSARTAN POTASSIUM 100 MG/1
1 TABLET, FILM COATED ORAL
Qty: 0 | Refills: 0 | DISCHARGE

## 2024-02-29 RX ORDER — FLURBIPROFEN SODIUM 0.03 %
0 DROPS OPHTHALMIC (EYE)
Qty: 0 | Refills: 0 | DISCHARGE

## 2024-02-29 RX ORDER — TAMSULOSIN HYDROCHLORIDE 0.4 MG/1
1 CAPSULE ORAL
Qty: 0 | Refills: 0 | DISCHARGE

## 2024-02-29 RX ORDER — HYDRALAZINE HCL 50 MG
50 TABLET ORAL ONCE
Refills: 0 | Status: COMPLETED | OUTPATIENT
Start: 2024-02-29 | End: 2024-02-29

## 2024-02-29 RX ORDER — KETOROLAC TROMETHAMINE 0.5 %
1 DROPS OPHTHALMIC (EYE)
Refills: 0 | DISCHARGE

## 2024-02-29 RX ORDER — CLOPIDOGREL BISULFATE 75 MG/1
75 TABLET, FILM COATED ORAL DAILY
Refills: 0 | Status: DISCONTINUED | OUTPATIENT
Start: 2024-03-01 | End: 2024-03-14

## 2024-02-29 RX ORDER — CLOPIDOGREL BISULFATE 75 MG/1
1 TABLET, FILM COATED ORAL
Qty: 90 | Refills: 3
Start: 2024-02-29 | End: 2025-02-22

## 2024-02-29 RX ORDER — LABETALOL HCL 100 MG
10 TABLET ORAL ONCE
Refills: 0 | Status: COMPLETED | OUTPATIENT
Start: 2024-02-29 | End: 2024-02-29

## 2024-02-29 RX ORDER — LOSARTAN POTASSIUM 100 MG/1
100 TABLET, FILM COATED ORAL ONCE
Refills: 0 | Status: COMPLETED | OUTPATIENT
Start: 2024-02-29 | End: 2024-02-29

## 2024-02-29 RX ORDER — SODIUM CHLORIDE 9 MG/ML
1000 INJECTION INTRAMUSCULAR; INTRAVENOUS; SUBCUTANEOUS
Refills: 0 | Status: DISCONTINUED | OUTPATIENT
Start: 2024-02-29 | End: 2024-03-14

## 2024-02-29 RX ORDER — LATANOPROST 0.05 MG/ML
1 SOLUTION/ DROPS OPHTHALMIC; TOPICAL
Qty: 0 | Refills: 0 | DISCHARGE

## 2024-02-29 RX ORDER — METOPROLOL TARTRATE 50 MG
1 TABLET ORAL
Qty: 0 | Refills: 0 | DISCHARGE

## 2024-02-29 RX ORDER — KETOROLAC TROMETHAMINE 0.5 %
0 DROPS OPHTHALMIC (EYE)
Qty: 0 | Refills: 0 | DISCHARGE

## 2024-02-29 RX ORDER — ATORVASTATIN CALCIUM 80 MG/1
1 TABLET, FILM COATED ORAL
Qty: 30 | Refills: 1
Start: 2024-02-29 | End: 2024-04-28

## 2024-02-29 RX ORDER — PRIMIDONE 250 MG/1
1 TABLET ORAL
Refills: 0 | DISCHARGE

## 2024-02-29 RX ADMIN — Medication 50 MILLIGRAM(S): at 15:31

## 2024-02-29 RX ADMIN — Medication 10 MILLIGRAM(S): at 19:39

## 2024-02-29 RX ADMIN — Medication 10 MILLIGRAM(S): at 15:11

## 2024-02-29 RX ADMIN — LOSARTAN POTASSIUM 100 MILLIGRAM(S): 100 TABLET, FILM COATED ORAL at 17:50

## 2024-02-29 RX ADMIN — SODIUM CHLORIDE 100 MILLILITER(S): 9 INJECTION INTRAMUSCULAR; INTRAVENOUS; SUBCUTANEOUS at 18:54

## 2024-02-29 RX ADMIN — Medication 10 MILLIGRAM(S): at 18:50

## 2024-02-29 NOTE — ASU PATIENT PROFILE, ADULT - TOBACCO USE
Never smoker Render Note In Bullet Format When Appropriate: No Include Z78.9 (Other Specified Conditions Influencing Health Status) As An Associated Diagnosis?: Yes Number Of Freeze-Thaw Cycles: 1 freeze-thaw cycle Medical Necessity Information: It is in your best interest to select a reason for this procedure from the list below. All of these items fulfill various CMS LCD requirements except the new and changing color options. Spray Paint Text: The liquid nitrogen was applied to the skin utilizing a spray paint frosting technique. Consent: The patient's consent was obtained including but not limited to risks of crusting, scabbing, blistering, scarring, darker or lighter pigmentary change, recurrence, and incomplete removal. Detail Level: Detailed Post-Care Instructions: I reviewed with the patient in detail post-care instructions. Patient is to wear sunprotection, and avoid picking at any of the treated lesions. Pt may apply Vaseline to crusted or scabbing areas. Medical Necessity Clause: This procedure was medically necessary because the lesions that were treated were: Duration Of Freeze Thaw-Cycle (Seconds): 5

## 2024-02-29 NOTE — ASU DISCHARGE PLAN (ADULT/PEDIATRIC) - NS MD DC FALL RISK RISK
For information on Fall & Injury Prevention, visit: [email or web address] OR  [email or web address] OR  https://www.cdc.gov/steadi/patient.html

## 2024-02-29 NOTE — ASU DISCHARGE PLAN (ADULT/PEDIATRIC) - CARE PROVIDER_API CALL
Tom Murray [facility]  Cardiovascular Disease  [address] [place] [address], [address]  [place] [address]  Phone: [phone]  Fax: [phone]  Established Patient  Follow Up Time: 2 weeks

## 2024-02-29 NOTE — H&P CARDIOLOGY - HISTORY OF PRESENT ILLNESS
82 yo M  PMHx DM , HTN c/o SOB occassional when climbing upstairs. He follows with [place] and had an abnormal pharmacologic nuclear stress. He presents today for [facility] . His stress test revealed reversible perfusion abnormality of mild to moderate intensity located at the mid anteroseptal, mid inferoseptal and mid inferior segments consistent with ischemia .  No fever or chills, no N/V/D or abd pain.

## 2025-05-24 NOTE — ED PROVIDER NOTE - ATTENDING CONTRIBUTION TO CARE
Hx of HTN, was taking Losartan 50mg but has since discontinued. Normotensive.  - No acute intervention, continue to monitor DR. WINTERS, ATTENDING MD-  I performed a face to face bedside interview with the patient regarding history of present illness, review of symptoms and past medical history. I completed an independent physical exam.  I have discussed the patient's plan of care with the resident.   Documentation as above in the note.    82-year-old male history of diabetes hypertension here with diffuse abdominal pain for 3 days duration.  Sent in by PCP for further evaluation.  Otherwise denies fevers chills nausea vomiting diarrhea dysuria urinary frequency flank pain.  Abdomen without pulsatile mass, soft no focal tenderness no rebound or guarding.  Evaluate for colitis diverticulitis.  Obtain CBC CMP lipase CT abdomen pelvis give IV fluid bolus pain medication and reassess.

## 2025-06-10 PROBLEM — Z00.00 ENCOUNTER FOR PREVENTIVE HEALTH EXAMINATION: Status: ACTIVE | Noted: 2025-06-10

## 2025-06-16 ENCOUNTER — APPOINTMENT (OUTPATIENT)
Dept: CT IMAGING | Facility: IMAGING CENTER | Age: 85
End: 2025-06-16

## 2025-07-01 ENCOUNTER — APPOINTMENT (OUTPATIENT)
Dept: CT IMAGING | Facility: IMAGING CENTER | Age: 85
End: 2025-07-01
Payer: MEDICARE

## 2025-07-01 ENCOUNTER — OUTPATIENT (OUTPATIENT)
Dept: OUTPATIENT SERVICES | Facility: HOSPITAL | Age: 85
LOS: 1 days | End: 2025-07-01
Payer: COMMERCIAL

## 2025-07-01 DIAGNOSIS — Z98.890 OTHER SPECIFIED POSTPROCEDURAL STATES: Chronic | ICD-10-CM

## 2025-07-01 DIAGNOSIS — I65.23 OCCLUSION AND STENOSIS OF BILATERAL CAROTID ARTERIES: ICD-10-CM

## 2025-07-01 PROCEDURE — 70498 CT ANGIOGRAPHY NECK: CPT

## 2025-07-01 PROCEDURE — 70498 CT ANGIOGRAPHY NECK: CPT | Mod: 26

## 2025-07-22 ENCOUNTER — NON-APPOINTMENT (OUTPATIENT)
Age: 85
End: 2025-07-22

## 2025-07-22 ENCOUNTER — APPOINTMENT (OUTPATIENT)
Dept: VASCULAR SURGERY | Facility: CLINIC | Age: 85
End: 2025-07-22
Payer: MEDICARE

## 2025-07-22 VITALS
SYSTOLIC BLOOD PRESSURE: 203 MMHG | BODY MASS INDEX: 22.49 KG/M2 | DIASTOLIC BLOOD PRESSURE: 77 MMHG | WEIGHT: 135 LBS | TEMPERATURE: 98 F | HEIGHT: 65 IN | HEART RATE: 61 BPM

## 2025-07-22 DIAGNOSIS — Q27.8 OTHER SPECIFIED CONGENITAL MALFORMATIONS OF PERIPHERAL VASCULAR SYSTEM: ICD-10-CM

## 2025-07-22 DIAGNOSIS — F17.200 NICOTINE DEPENDENCE, UNSPECIFIED, UNCOMPLICATED: ICD-10-CM

## 2025-07-22 DIAGNOSIS — I65.23 OCCLUSION AND STENOSIS OF BILATERAL CAROTID ARTERIES: ICD-10-CM

## 2025-07-22 PROCEDURE — 99204 OFFICE O/P NEW MOD 45 MIN: CPT

## 2025-07-22 RX ORDER — LOSARTAN POTASSIUM 100 MG/1
TABLET, FILM COATED ORAL
Refills: 0 | Status: ACTIVE | COMMUNITY

## 2025-07-22 RX ORDER — ASPIRIN 81 MG
81 TABLET,CHEWABLE ORAL
Refills: 0 | Status: ACTIVE | COMMUNITY

## 2025-07-22 RX ORDER — CLOPIDOGREL 75 MG/1
TABLET, FILM COATED ORAL
Refills: 0 | Status: ACTIVE | COMMUNITY

## 2025-07-22 RX ORDER — GLIPIZIDE 2.5 MG/1
TABLET ORAL
Refills: 0 | Status: ACTIVE | COMMUNITY

## 2025-07-22 RX ORDER — METOPROLOL TARTRATE 75 MG/1
TABLET, FILM COATED ORAL
Refills: 0 | Status: ACTIVE | COMMUNITY

## 2025-09-09 ENCOUNTER — APPOINTMENT (OUTPATIENT)
Dept: OTOLARYNGOLOGY | Facility: CLINIC | Age: 85
End: 2025-09-09
Payer: MEDICARE

## 2025-09-09 VITALS
HEIGHT: 65 IN | HEART RATE: 68 BPM | DIASTOLIC BLOOD PRESSURE: 75 MMHG | TEMPERATURE: 98.2 F | WEIGHT: 135 LBS | SYSTOLIC BLOOD PRESSURE: 191 MMHG | BODY MASS INDEX: 22.49 KG/M2 | OXYGEN SATURATION: 99 %

## 2025-09-09 DIAGNOSIS — H61.23 IMPACTED CERUMEN, BILATERAL: ICD-10-CM

## 2025-09-09 DIAGNOSIS — H90.3 SENSORINEURAL HEARING LOSS, BILATERAL: ICD-10-CM

## 2025-09-09 DIAGNOSIS — R26.89 OTHER ABNORMALITIES OF GAIT AND MOBILITY: ICD-10-CM

## 2025-09-09 DIAGNOSIS — R42 DIZZINESS AND GIDDINESS: ICD-10-CM

## 2025-09-09 PROCEDURE — 92567 TYMPANOMETRY: CPT

## 2025-09-09 PROCEDURE — 99204 OFFICE O/P NEW MOD 45 MIN: CPT | Mod: 25

## 2025-09-09 PROCEDURE — G0268 REMOVAL OF IMPACTED WAX MD: CPT

## 2025-09-09 PROCEDURE — 92557 COMPREHENSIVE HEARING TEST: CPT

## 2025-09-09 RX ORDER — ATORVASTATIN CALCIUM 80 MG/1
80 TABLET, FILM COATED ORAL
Refills: 0 | Status: ACTIVE | COMMUNITY

## 2025-09-09 RX ORDER — GABAPENTIN 300 MG/1
300 CAPSULE ORAL
Refills: 0 | Status: ACTIVE | COMMUNITY